# Patient Record
Sex: FEMALE | Race: WHITE | NOT HISPANIC OR LATINO | Employment: OTHER | URBAN - NONMETROPOLITAN AREA
[De-identification: names, ages, dates, MRNs, and addresses within clinical notes are randomized per-mention and may not be internally consistent; named-entity substitution may affect disease eponyms.]

---

## 2018-01-14 ENCOUNTER — HOSPITAL ENCOUNTER (EMERGENCY)
Facility: HOSPITAL | Age: 43
Discharge: HOME | End: 2018-01-14
Attending: PHYSICIAN ASSISTANT | Admitting: PHYSICIAN ASSISTANT
Payer: COMMERCIAL

## 2018-01-14 VITALS
RESPIRATION RATE: 16 BRPM | TEMPERATURE: 97.6 F | DIASTOLIC BLOOD PRESSURE: 73 MMHG | SYSTOLIC BLOOD PRESSURE: 130 MMHG | OXYGEN SATURATION: 97 %

## 2018-01-14 DIAGNOSIS — B35.4 TINEA CORPORIS: ICD-10-CM

## 2018-01-14 DIAGNOSIS — L55.1 SECOND DEGREE SUNBURN: ICD-10-CM

## 2018-01-14 PROCEDURE — 99203 OFFICE O/P NEW LOW 30 MIN: CPT | Performed by: PHYSICIAN ASSISTANT

## 2018-01-14 PROCEDURE — 25000132 ZZH RX MED GY IP 250 OP 250 PS 637: Performed by: PHYSICIAN ASSISTANT

## 2018-01-14 PROCEDURE — G0463 HOSPITAL OUTPT CLINIC VISIT: HCPCS

## 2018-01-14 RX ORDER — FLUCONAZOLE 150 MG/1
TABLET ORAL
Qty: 1 TABLET | Refills: 0 | Status: SHIPPED | OUTPATIENT
Start: 2018-01-14 | End: 2020-09-08

## 2018-01-14 RX ORDER — FLUCONAZOLE 150 MG/1
150 TABLET ORAL DAILY
Status: DISCONTINUED | OUTPATIENT
Start: 2018-01-14 | End: 2018-01-14 | Stop reason: HOSPADM

## 2018-01-14 RX ORDER — KETOCONAZOLE 20 MG/G
CREAM TOPICAL
Qty: 60 G | Refills: 0 | Status: SHIPPED | OUTPATIENT
Start: 2018-01-14 | End: 2020-09-08

## 2018-01-14 RX ADMIN — FLUCONAZOLE 150 MG: 150 TABLET ORAL at 19:43

## 2018-01-14 ASSESSMENT — ENCOUNTER SYMPTOMS
CARDIOVASCULAR NEGATIVE: 1
PSYCHIATRIC NEGATIVE: 1
NEUROLOGICAL NEGATIVE: 1
CONSTITUTIONAL NEGATIVE: 1

## 2018-01-14 NOTE — ED AVS SNAPSHOT
[facility]    [address] [address]    Phone:  [phone]                                       Freda Alanis   MRN: [id number]    Department:  [facility]   Date of Visit:  1/14/2018           Patient Information     Date Of Birth          1975        Your diagnoses for this visit were:     Tinea corporis     Second degree sunburn        You were seen by Elizabeth Barakat PA.      Follow-up Information     Please follow up.    Why:  If symptoms worsen, with a Dermatologist.        Follow up with [facility].    Specialty:  EMERGENCY MEDICINE    Why:  If further concerns develop    Contact information:    [address] [address] [phone]    Additional information:    From [facility]: Take [address]. Turn left at [address]. Turn left at the first stoplight on [address]. At the first stop sign, take a right onto [address]. Take a left into the parking lot and continue through until you reach the North enterance of the building.       From [place]: Take [address]. Take the [address] ramp towards [place]. Turn left onto [address]. Take a slight right onto [address] [facility]. Turn left at the first stoplight on [address]. At the first stop sign, take a right onto [address]. Take a left into the parking lot and continue through until you reach the North enterance of the building.       From [place]: Take [address]. Take a right at [address]. At the first stop sign, take a right onto [address]. Take a left into the parking lot and continue through until you reach the North enterance of the building.       Discharge References/Attachments     FUNGAL SKIN INFECTION (TINEA) (ENGLISH)    SUNBURN (ENGLISH)         Review of your medicines      START taking        Dose / Directions Last dose taken    fluconazole 150 MG tablet   Commonly  "known as:  DIFLUCAN   Quantity:  1 tablet        Take one tablet in two days   Refills:  0        ketoconazole 2 % cream   Commonly known as:  NIZORAL   Quantity:  60 g        Apply thin layer twice a day when you have the rash   Refills:  0          Our records show that you are taking the medicines listed below. If these are incorrect, please call your family doctor or clinic.        Dose / Directions Last dose taken    SPIRONOLACTONE PO        Refills:  0        TRAZODONE HCL PO        Take  by mouth nightly as needed.   Refills:  0                Prescriptions were sent or printed at these locations (2 Prescriptions)                   [facility] - [place], [place] - [address] AT [facility] [address]   [address], [place] [address]    Telephone:  [phone]   Fax:  [phone]   Hours:                  E-Prescribed (2 of 2)         fluconazole (DIFLUCAN) 150 MG tablet               ketoconazole (NIZORAL) 2 % cream                Orders Needing Specimen Collection     None      Pending Results     No orders found from 1/12/2018 to 1/15/2018.            Pending Culture Results     No orders found from 1/12/2018 to 1/15/2018.            Thank you for choosing [facility]       Thank you for choosing [facility] for your care. Our goal is always to provide you with excellent care. Hearing back from our patients is one way we can continue to improve our services. Please take a few minutes to complete the written survey that you may receive in the mail after you visit with us. Thank you!        [employer] Information     [employer] lets you send messages to your doctor, view your test results, renew your prescriptions, schedule appointments and more. To sign up, go to [email or web address] . Click on \"Log in\" on the left side of the screen, which will take you to the Welcome page. Then click on \"Sign up Now\" on the right side of the page.     You will be asked to enter the access code listed below, " as well as some personal information. Please follow the directions to create your username and password.     Your access code is: [email or web address]  Expires: 2018  7:03 PM     Your access code will  in 90 days. If you need help or a new code, please call your [facility] clinic or [phone].        Care EveryWhere ID     This is your Care EveryWhere ID. This could be used by other organizations to access your [facility] medical records  [id number]        Equal Access to Services     MADDIE DE LA O : Sheryl adames Soadrien, waraeann lujackyadaha, qananci kaalmalasha guerra, jamaal tompkins . So [facility] [phone].    ATENCIÓN: Si habla español, tiene a breen disposición servicios gratuitos de asistencia lingüística. Llame al [phone].    We comply with applicable federal civil rights laws and [place] laws. We do not discriminate on the basis of race, color, national origin, age, disability, sex, sexual orientation, or gender identity.            After Visit Summary       This is your record. Keep this with you and show to your community pharmacist(s) and doctor(s) at your next visit.

## 2018-01-14 NOTE — ED AVS SNAPSHOT
[facility]    [address] [address]    Phone:  [phone]                                       Freda Alanis   MRN: [id number]    Department:  [facility]   Date of Visit:  1/14/2018           After Visit Summary Signature Page     I have received my discharge instructions, and my questions have been answered. I have discussed any challenges I see with this plan with the nurse or doctor.    ..........................................................................................................................................  Patient/Patient Representative Signature      ..........................................................................................................................................  Patient Representative Print Name and Relationship to Patient    ..................................................               ................................................  Date                                            Time    ..........................................................................................................................................  Reviewed by Signature/Title    ...................................................              ..............................................  Date                                                            Time

## 2018-01-15 NOTE — ED PROVIDER NOTES
History     Chief Complaint   Patient presents with     Rash     c/o rash on chest, notes burning, notes has been in the tanning [place] with the last day being friday.      The history is provided by the patient. No  was used.     Freda Alanis is a 42 year old female who has developed an itchy /burning rash between her breasts.  Pt tans using tanning beds.  Last use was 3 days ago. Pt tans for 15 minutes at a time. Pt admits she  Sweats much during these sessions. She has a h/o skin cancer x 3. Yet, she refuses to stop tanning.     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    Social History:  Marital Status:   [2]  Social History   Substance Use Topics     Smoking status: Current Every Day Smoker     Years: 10.00     Types: Cigarettes     Smokeless tobacco: Never Used     Alcohol use Yes        Medications:      SPIRONOLACTONE PO   fluconazole (DIFLUCAN) 150 MG tablet   ketoconazole (NIZORAL) 2 % cream   TRAZODONE HCL PO         Review of Systems   Constitutional: Negative.    HENT: Negative.    Cardiovascular: Negative.    Skin: Positive for rash.   Neurological: Negative.    Psychiatric/Behavioral: Negative.        Physical Exam   BP: 130/73  Heart Rate: 102  Temp: 97.6  F (36.4  C)  Resp: 16  SpO2: 97 %      Physical Exam   Constitutional: She is oriented to person, place, and time. She appears well-developed and well-nourished. No distress.   Cardiovascular:   tachycardia   Pulmonary/Chest: Effort normal.   Neurological:  She is alert and oriented to person, place, and time.   Skin: Rash noted. She is not diaphoretic.   Chest: between the breasts there is a fine small blisters notes. No vesicles. No pustules. No TTP. Base of erythema with mild raised leading edge   Psychiatric: She has a normal mood and affect.   Nursing note and vitals reviewed.      ED Course     ED Course     Procedures               Assessments & Plan (with Medical Decision Making)     I have reviewed the nursing notes.    I have reviewed the findings, diagnosis, plan and need for follow up with the patient.      Discharge Medication List as of 1/14/2018  7:33 PM      START taking these medications    Details   fluconazole (DIFLUCAN) 150 MG tablet Take one tablet in two days, Disp-1 tablet, R-0, E-Prescribe      ketoconazole (NIZORAL) 2 % cream Apply thin layer twice a day when you have the rashDisp-60 g, [id number]             Final diagnoses:   Tinea corporis   Second degree sunburn       Stop tanning!  Patient verbally educated and given appropriate education sheets for the diagnoses and has no questions.  Use medications as directed.   Follow up with a Dermatologist if symptoms increase or if they are not resolving.   if concerns develop, return to the ER  Elizabeth Barakat Certified  Physician Assistant  1/14/2018  7:53 PM  URGENT CARE CLINIC      1/14/2018   [facility]     Elizabeth Barakat PA  01/14/18 1954

## 2020-09-01 NOTE — PROGRESS NOTES
"Subjective     Freda Alanis is a 45 year old female who presents to clinic today for the following health issues:    HPI       New Patient/Transfer of Care    Previous care with [facility]- does not know provider she went to or the clinic name. No release signed     Moved to the area about 3 years ago - has not established with any provider prior to today     Unknown  PAP 2017- miscarriage at that time states it was normal  Mammogram has never had a mammogram     Smoker 5-6 per day   Alcohol use couple glasses per day   Street drugs denies     Elevated blood pressure and pulse today   This is not her normal - will continue to monitor     Anxiety and depression  States she is well controlled   No medications for 3 years  Denies counseling or need at this time  PHQ 9/8/2020   PHQ-9 Total Score 6   Q9: Thoughts of better off dead/self-harm past 2 weeks Not at all     LUIS-7 SCORE 9/8/2020   Total Score 1           Musculoskeletal problem/pain  Onset/Duration: about 4 months ago  Description  Location: bilateral feet, hands and lower back - generalized all over at different times  Joint Swelling: no  Redness: no  Pain: YES  Warmth: no  Intensity:  6/10  Progression of Symptoms:  worsening  Accompanying signs and symptoms:   Fevers: no  Numbness/tingling/weakness: YES  History  Trauma to the area: no  Recent illness:  no  Previous similar problem: no  Previous evaluation:  no  Precipitating or alleviating factors:  Aggravating factors include: \"anything\" touching, bumping  Therapies tried and outcome: tylenol and advil- minor relief   She tries to stretch and elevate at times  She has not tried ice or heat  Denies chiropractor or massage therapu       Review of Systems   CONSTITUTIONAL: NEGATIVE for fever, chills, change in weight  INTEGUMENTARY/SKIN: history of eczema on face   EYES: NEGATIVE for vision changes or irritation  ENT/MOUTH: NEGATIVE for ear, mouth and throat problems  RESP:NEGATIVE for " significant cough or SOB  CV: NEGATIVE for chest pain, palpitations or peripheral edema  GI: lactose and gluten intolerance   : normal menstrual cycles and denies dysuria   MUSCULOSKELETAL: generalized   NEURO: weakness into her hands   ENDOCRINE: hair thinning/feels like it is falling out   PSYCHIATRIC: NEGATIVE for changes in mood or affect      Objective    BP (!) 155/94   Pulse 101   Temp 98.8  F (37.1  C) (Tympanic)   Wt 73.9 kg (163 lb)   SpO2 96%   BMI 28.20 kg/m    Body mass index is 28.2 kg/m .  Physical Exam   GENERAL: alert and no distress  NECK: no adenopathy, no asymmetry, masses, or scars and thyroid normal to palpation  RESP: lungs clear to auscultation - no rales, rhonchi or wheezes  CV: regular rate and rhythm, normal S1 S2, no S3 or S4, no murmur, click or rub, no peripheral edema and peripheral pulses strong  ABDOMEN: soft, nontender, no hepatosplenomegaly, no masses and bowel sounds normal  MS: no gross musculoskeletal defects noted, no edema  SKIN: no suspicious lesions or rashes  NEURO: Normal strength and tone, mentation intact and speech normal  PSYCH: mentation appears normal, affect normal/bright    Results for orders placed or performed in visit on 09/08/20 (from the past 24 hour(s))   CBC with platelets differential   Result Value Ref Range    WBC 10.0 4.0 - 11.0 10e9/L    RBC Count 4.23 3.8 - 5.2 10e12/L    Hemoglobin 15.2 11.7 - 15.7 g/dL    Hematocrit 44.7 35.0 - 47.0 %     (H) 78 - 100 fl    MCH 35.9 (H) 26.5 - 33.0 pg    MCHC 34.0 31.5 - 36.5 g/dL    RDW 14.1 10.0 - 15.0 %    Platelet Count 332 150 - 450 10e9/L    Diff Method Automated Method     % Neutrophils 66.7 %    % Lymphocytes 17.5 %    % Monocytes 12.3 %    % Eosinophils 2.2 %    % Basophils 0.6 %    % Immature Granulocytes 0.7 %    Nucleated RBCs 0 0 /100    Absolute Neutrophil 6.7 1.6 - 8.3 10e9/L    Absolute Lymphocytes 1.8 0.8 - 5.3 10e9/L    Absolute Monocytes 1.2 0.0 - 1.3 10e9/L    Absolute Eosinophils  0.2 0.0 - 0.7 10e9/L    Absolute Basophils 0.1 0.0 - 0.2 10e9/L    Abs Immature Granulocytes 0.1 0 - 0.4 10e9/L    Absolute Nucleated RBC 0.0    Comprehensive metabolic panel   Result Value Ref Range    Sodium 136 133 - 144 mmol/L    Potassium 3.9 3.4 - 5.3 mmol/L    Chloride 102 94 - 109 mmol/L    Carbon Dioxide 27 20 - 32 mmol/L    Anion Gap 7 3 - 14 mmol/L    Glucose 125 (H) 70 - 99 mg/dL    Urea Nitrogen 4 (L) 7 - 30 mg/dL    Creatinine 0.54 0.52 - 1.04 mg/dL    GFR Estimate >90 >60 mL/min/[1.73_m2]    GFR Estimate If Black >90 >60 mL/min/[1.73_m2]    Calcium 9.5 8.5 - 10.1 mg/dL    Bilirubin Total 0.4 0.2 - 1.3 mg/dL    Albumin 3.7 3.4 - 5.0 g/dL    Protein Total 8.1 6.8 - 8.8 g/dL    Alkaline Phosphatase 168 (H) 40 - 150 U/L     (H) 0 - 50 U/L     (H) 0 - 45 U/L   CRP inflammation   Result Value Ref Range    CRP Inflammation 9.0 (H) 0.0 - 8.0 mg/L   Uric acid   Result Value Ref Range    Uric Acid 5.6 2.6 - 6.0 mg/dL   TSH with free T4 reflex   Result Value Ref Range    TSH 2.72 0.40 - 4.00 mU/L   Hemoglobin A1c   Result Value Ref Range    Hemoglobin A1C 6.1 (H) 0 - 5.6 %             Assessment & Plan     Multiple joint pain  Reviewed labs  Elevated liver enzymes, encouraged to stop drinking, limit tylenol and NSAID use  Added additional labs  Waiting on NICHOLAS, Rheumatoid factor and tick labs  Discussed symptomatic treatment with warm soaks, daily stretching   - CBC with platelets differential  - Comprehensive metabolic panel  - CRP inflammation  - Rheumatoid factor  - Uric acid  - Parasite stain  - Lyme Disease Laura with reflex to WB Serum  - Anti Nuclear Laura IgG by IFA with Reflex  - naproxen (NAPROSYN) 500 MG tablet; Take 1 tablet (500 mg) by mouth 2 times daily (with meals)  - TSH with free T4 reflex  - Vitamin B12  - Folate  - Estimated Average Glucose    Anxiety and depression  States symptoms are controlled     Elevated liver enzymes  Encouraged alcohol cessation  Limited tylenol and  ibuprofen  Consider healthy eating, staying hydrated and weigh loss  - Hepatitis C Screen Reflex to HCV RNA Quant and Genotype  - Hepatitis B Surface Antibody    Pre-diabetes  Consider healthy eating,   - Hemoglobin A1c     Tobacco Cessation:   reports that she has been smoking cigarettes. She started smoking about 25 years ago. She has smoked for the past 10.00 years. She has never used smokeless tobacco.    Plan to have come back in for a PAP and additional labs     See Patient Instructions    No follow-ups on file.    DANTE Sanchez CNP  [facility] - [place]

## 2020-09-08 ENCOUNTER — OFFICE VISIT (OUTPATIENT)
Dept: FAMILY MEDICINE | Facility: OTHER | Age: 45
End: 2020-09-08
Attending: NURSE PRACTITIONER
Payer: COMMERCIAL

## 2020-09-08 VITALS
OXYGEN SATURATION: 96 % | SYSTOLIC BLOOD PRESSURE: 132 MMHG | TEMPERATURE: 98.8 F | DIASTOLIC BLOOD PRESSURE: 80 MMHG | BODY MASS INDEX: 28.2 KG/M2 | HEART RATE: 101 BPM | WEIGHT: 163 LBS

## 2020-09-08 DIAGNOSIS — F32.A ANXIETY AND DEPRESSION: ICD-10-CM

## 2020-09-08 DIAGNOSIS — M25.50 MULTIPLE JOINT PAIN: Primary | ICD-10-CM

## 2020-09-08 DIAGNOSIS — R74.8 ELEVATED LIVER ENZYMES: ICD-10-CM

## 2020-09-08 DIAGNOSIS — R73.03 PRE-DIABETES: ICD-10-CM

## 2020-09-08 DIAGNOSIS — F41.9 ANXIETY AND DEPRESSION: ICD-10-CM

## 2020-09-08 LAB
ALBUMIN SERPL-MCNC: 3.7 G/DL (ref 3.4–5)
ALP SERPL-CCNC: 168 U/L (ref 40–150)
ALT SERPL W P-5'-P-CCNC: 140 U/L (ref 0–50)
ANION GAP SERPL CALCULATED.3IONS-SCNC: 7 MMOL/L (ref 3–14)
AST SERPL W P-5'-P-CCNC: 240 U/L (ref 0–45)
BASOPHILS # BLD AUTO: 0.1 10E9/L (ref 0–0.2)
BASOPHILS NFR BLD AUTO: 0.6 %
BILIRUB SERPL-MCNC: 0.4 MG/DL (ref 0.2–1.3)
BUN SERPL-MCNC: 4 MG/DL (ref 7–30)
CALCIUM SERPL-MCNC: 9.5 MG/DL (ref 8.5–10.1)
CHLORIDE SERPL-SCNC: 102 MMOL/L (ref 94–109)
CO2 SERPL-SCNC: 27 MMOL/L (ref 20–32)
CREAT SERPL-MCNC: 0.54 MG/DL (ref 0.52–1.04)
CRP SERPL-MCNC: 9 MG/L (ref 0–8)
DIFFERENTIAL METHOD BLD: ABNORMAL
EOSINOPHIL # BLD AUTO: 0.2 10E9/L (ref 0–0.7)
EOSINOPHIL NFR BLD AUTO: 2.2 %
ERYTHROCYTE [DISTWIDTH] IN BLOOD BY AUTOMATED COUNT: 14.1 % (ref 10–15)
EST. AVERAGE GLUCOSE BLD GHB EST-MCNC: 128 MG/DL
FOLATE SERPL-MCNC: 5.7 NG/ML
GFR SERPL CREATININE-BSD FRML MDRD: >90 ML/MIN/{1.73_M2}
GLUCOSE SERPL-MCNC: 125 MG/DL (ref 70–99)
HBA1C MFR BLD: 6.1 % (ref 0–5.6)
HCT VFR BLD AUTO: 44.7 % (ref 35–47)
HGB BLD-MCNC: 15.2 G/DL (ref 11.7–15.7)
IMM GRANULOCYTES # BLD: 0.1 10E9/L (ref 0–0.4)
IMM GRANULOCYTES NFR BLD: 0.7 %
LYMPHOCYTES # BLD AUTO: 1.8 10E9/L (ref 0.8–5.3)
LYMPHOCYTES NFR BLD AUTO: 17.5 %
MCH RBC QN AUTO: 35.9 PG (ref 26.5–33)
MCHC RBC AUTO-ENTMCNC: 34 G/DL (ref 31.5–36.5)
MCV RBC AUTO: 106 FL (ref 78–100)
MONOCYTES # BLD AUTO: 1.2 10E9/L (ref 0–1.3)
MONOCYTES NFR BLD AUTO: 12.3 %
NEUTROPHILS # BLD AUTO: 6.7 10E9/L (ref 1.6–8.3)
NEUTROPHILS NFR BLD AUTO: 66.7 %
NRBC # BLD AUTO: 0 10*3/UL
NRBC BLD AUTO-RTO: 0 /100
PLATELET # BLD AUTO: 332 10E9/L (ref 150–450)
POTASSIUM SERPL-SCNC: 3.9 MMOL/L (ref 3.4–5.3)
PROT SERPL-MCNC: 8.1 G/DL (ref 6.8–8.8)
RBC # BLD AUTO: 4.23 10E12/L (ref 3.8–5.2)
SODIUM SERPL-SCNC: 136 MMOL/L (ref 133–144)
TSH SERPL DL<=0.005 MIU/L-ACNC: 2.72 MU/L (ref 0.4–4)
URATE SERPL-MCNC: 5.6 MG/DL (ref 2.6–6)
VIT B12 SERPL-MCNC: 393 PG/ML (ref 193–986)
WBC # BLD AUTO: 10 10E9/L (ref 4–11)

## 2020-09-08 PROCEDURE — 86706 HEP B SURFACE ANTIBODY: CPT | Performed by: NURSE PRACTITIONER

## 2020-09-08 PROCEDURE — 36415 COLL VENOUS BLD VENIPUNCTURE: CPT | Performed by: NURSE PRACTITIONER

## 2020-09-08 PROCEDURE — 84550 ASSAY OF BLOOD/URIC ACID: CPT | Performed by: NURSE PRACTITIONER

## 2020-09-08 PROCEDURE — 86803 HEPATITIS C AB TEST: CPT | Performed by: NURSE PRACTITIONER

## 2020-09-08 PROCEDURE — 86618 LYME DISEASE ANTIBODY: CPT | Performed by: NURSE PRACTITIONER

## 2020-09-08 PROCEDURE — 87015 SPECIMEN INFECT AGNT CONCNTJ: CPT | Performed by: NURSE PRACTITIONER

## 2020-09-08 PROCEDURE — 86431 RHEUMATOID FACTOR QUANT: CPT | Performed by: NURSE PRACTITIONER

## 2020-09-08 PROCEDURE — 99214 OFFICE O/P EST MOD 30 MIN: CPT | Performed by: NURSE PRACTITIONER

## 2020-09-08 PROCEDURE — 82607 VITAMIN B-12: CPT | Performed by: NURSE PRACTITIONER

## 2020-09-08 PROCEDURE — 86038 ANTINUCLEAR ANTIBODIES: CPT | Performed by: NURSE PRACTITIONER

## 2020-09-08 PROCEDURE — 86140 C-REACTIVE PROTEIN: CPT | Performed by: NURSE PRACTITIONER

## 2020-09-08 PROCEDURE — 83036 HEMOGLOBIN GLYCOSYLATED A1C: CPT | Performed by: NURSE PRACTITIONER

## 2020-09-08 PROCEDURE — 80050 GENERAL HEALTH PANEL: CPT | Performed by: NURSE PRACTITIONER

## 2020-09-08 PROCEDURE — 82746 ASSAY OF FOLIC ACID SERUM: CPT | Performed by: NURSE PRACTITIONER

## 2020-09-08 PROCEDURE — 87207 SMEAR SPECIAL STAIN: CPT | Performed by: NURSE PRACTITIONER

## 2020-09-08 RX ORDER — MULTIVITAMIN,THERAPEUTIC
1 TABLET ORAL DAILY
COMMUNITY
End: 2023-05-10

## 2020-09-08 RX ORDER — LORATADINE 10 MG/1
10 TABLET ORAL DAILY PRN
COMMUNITY

## 2020-09-08 RX ORDER — NAPROXEN 500 MG/1
500 TABLET ORAL 2 TIMES DAILY WITH MEALS
Qty: 60 TABLET | Refills: 1 | Status: ON HOLD | OUTPATIENT
Start: 2020-09-08 | End: 2023-05-01

## 2020-09-08 ASSESSMENT — ANXIETY QUESTIONNAIRES
5. BEING SO RESTLESS THAT IT IS HARD TO SIT STILL: NOT AT ALL
7. FEELING AFRAID AS IF SOMETHING AWFUL MIGHT HAPPEN: NOT AT ALL
6. BECOMING EASILY ANNOYED OR IRRITABLE: NOT AT ALL
4. TROUBLE RELAXING: SEVERAL DAYS
2. NOT BEING ABLE TO STOP OR CONTROL WORRYING: NOT AT ALL
3. WORRYING TOO MUCH ABOUT DIFFERENT THINGS: NOT AT ALL
GAD7 TOTAL SCORE: 1
1. FEELING NERVOUS, ANXIOUS, OR ON EDGE: NOT AT ALL

## 2020-09-08 ASSESSMENT — PATIENT HEALTH QUESTIONNAIRE - PHQ9: SUM OF ALL RESPONSES TO PHQ QUESTIONS 1-9: 6

## 2020-09-08 ASSESSMENT — PAIN SCALES - GENERAL: PAINLEVEL: SEVERE PAIN (6)

## 2020-09-08 NOTE — LETTER
My Depression Action Plan  Name: Freda Alanis   Date of Birth 1975  Date: 9/8/2020    My doctor: No Ref-Primary, Physician   My clinic: [facility] [place]  [address] [place] [address]  [phone]          GREEN    ZONE   Good Control    What it looks like:     Things are going generally well. You have normal ups and downs. You may even feel depressed from time to time, but bad moods usually last less than a day.   What you need to do:  1. Continue to care for yourself (see self care plan)  2. Check your depression survival kit and update it as needed  3. Follow your physician s recommendations including any medication.  4. Do not stop taking medication unless you consult with your physician first.           YELLOW         ZONE Getting Worse    What it looks like:     Depression is starting to interfere with your life.     It may be hard to get out of bed; you may be starting to isolate yourself from others.    Symptoms of depression are starting to last most all day and this has happened for several days.     You may have suicidal thoughts but they are not constant.   What you need to do:     1. Call your care team. Your response to treatment will improve if you keep your care team informed of your progress. Yellow periods are signs an adjustment may need to be made.     2. Continue your self-care.  Just get dressed and ready for the day.  Don't give yourself time to talk yourself out of it.    3. Talk to someone in your support network.    4. Open up your Depression Self-Care Plan/Wellness Kit.           RED    ZONE Medical Alert - Get Help    What it looks like:     Depression is seriously interfering with your life.     You may experience these or other symptoms: You can t get out of bed most days, can t work or engage in other necessary activities, you have trouble taking care of basic hygiene, or basic responsibilities, thoughts of suicide or death that will not  go away, self-injurious behavior.     What you need to do:  1. Call your care team and request a same-day appointment. If they are not available (weekends or after hours) call your local crisis line, emergency room or 911.            Depression Self-Care Plan / Wellness Kit    Self-Care for Depression  Here s the deal. Your body and mind are really not as separate as most people think.  What you do and think affects how you feel and how you feel influences what you do and think. This means if you do things that people who feel good do, it will help you feel better.  Sometimes this is all it takes.  There is also a place for medication and therapy depending on how severe your depression is, so be sure to consult with your medical provider and/ or Behavioral Health Consultant if your symptoms are worsening or not improving.     In order to better manage my stress, I will:    Exercise  Get some form of exercise, every day. This will help reduce pain and release endorphins, the  feel good  chemicals in your brain. This is almost as good as taking antidepressants!  This is not the same as joining a gym and then never going! (they count on that by the way ) It can be as simple as just going for a walk or doing some gardening, anything that will get you moving.      Hygiene   Maintain good hygiene (get out of bed in the morning, make your bed, brush your teeth, take a shower, and get dressed like you were going to work, even if you are unemployed).  If your clothes don't fit try to get ones that do.    Diet  Strive to eat foods that are good for me, drink plenty of water, and avoid excessive sugar, caffeine, alcohol, and other mood-altering substances.  Some foods that are helpful in depression are: complex carbohydrates, B vitamins, flaxseed, fish or fish oil, fresh fruits and vegetables.    Psychotherapy  Agree to participate in Individual Therapy (if recommended).    Medication  If prescribed medications, I agree to  take them.  Missing doses can result in serious side effects.  I understand that drinking alcohol, or other illicit drug use, may cause potential side effects.  I will not stop my medication abruptly without first discussing it with my provider.    Staying Connected With Others  Stay in touch with my friends, family members, and my primary care provider/team.    Use your imagination  Be creative.  We all have a creative side; it doesn t matter if it s oil painting, sand castles, or mud pies! This will also kick up the endorphins.    Witness Beauty  (AKA stop and smell the roses) Take a look outside, even in mid-winter. Notice colors, textures. Watch the squirrels and birds.     Service to others  Be of service to others.  There is always someone else in need.  By helping others we can  get out of ourselves  and remember the really important things.  This also provides opportunities for practicing all the other parts of the program.    Humor  Laugh and be silly!  Adjust your TV habits for less news and crime-drama and more comedy.    Control your stress  Try breathing deep, massage therapy, biofeedback, and meditation. Find time to relax each day.     Crisis Text Line  http://www.crisistextline.org    The Crisis Text Line serves anyone, in any type of crisis, providing access to free, 24/7 support and information via the medium people already use and trust:    Here's how it works:  1.  Text 301-304 from anywhere in the [place], anytime, about any type of crisis.  2.  A live, trained Crisis Counselor receives the text and responds quickly.  3.  The volunteer Crisis Counselor will help you move from a 'hot moment to a cool moment'.    My support system    Clinic Contact:  Phone number:    Contact 1:  Phone number:    Contact 2:  Phone number:    Zoroastrianism/:  Phone number:    Therapist:  Phone number:    Local crisis center:    Phone number:    Other community support:  Phone number:

## 2020-09-08 NOTE — PATIENT INSTRUCTIONS
Patient Education     Arthralgia    Arthralgia is the term for pain in or around the joint. It is a symptom, not a disease. This pain may involve one or more joints. In some cases, the pain moves from joint to joint.  There are many causes for joint pain. These include:    Injury    Osteoarthritis (wearing out of the joint surface)    Gout (inflammation of the joint due to crystals in the joint fluid)    Infection inside the joint      Bursitis (inflammation of the fluid-filled sacs around the joint)    Autoimmune disorders such as rheumatoid arthritis or lupus    Tendonitis (inflammation of chords that attach muscle to bone)  Home care    Rest the involved joint(s) until your symptoms improve.     You may be prescribed pain medicine. If none is prescribed, you may use acetaminophen or ibuprofen to control pain and inflammation.    Follow-up care  Follow up with your healthcare provider or as advised.  When to seek medical advice  Contact your healthcare provider right away if any of the following occurs:    Pain, swelling, or redness of joint increases    Pain worsens or recurs after a period of improvement    Pain moves to other joints    You cannot bear weight on the affected joint     You cannot move the affected joint    Joint appears deformed    New rash appears    Fever of 100.4 F (38 C) or higher, or as directed by your healthcare provider  Date Last Reviewed: 3/1/2017    5327-7277 [employer]. [address] [place] [address], [place] [address]. All rights reserved. This information is not intended as a substitute for professional medical care. Always follow your healthcare professional's instructions.

## 2020-09-08 NOTE — NURSING NOTE
"Chief Complaint   Patient presents with     Establish Care     Musculoskeletal Problem       Initial BP (!) 155/94   Pulse 101   Temp 98.8  F (37.1  C) (Tympanic)   Wt 73.9 kg (163 lb)   SpO2 96%   BMI 28.20 kg/m   Estimated body mass index is 28.2 kg/m  as calculated from the following:    Height as of 10/22/12: 1.619 m (5' 3.75\").    Weight as of this encounter: 73.9 kg (163 lb).  Medication Reconciliation: complete  Angelica Franco LPN  "

## 2020-09-09 LAB
ANA SER QL IF: NEGATIVE
B BURGDOR IGG+IGM SER QL: 0.06 (ref 0–0.89)
HBV SURFACE AB SERPL IA-ACNC: 0 M[IU]/ML
HCV AB SERPL QL IA: NONREACTIVE
PARASITE SPEC INSPECT: NORMAL
RHEUMATOID FACT SER NEPH-ACNC: 10 IU/ML (ref 0–20)
SPECIMEN SOURCE: NORMAL

## 2020-09-09 ASSESSMENT — ANXIETY QUESTIONNAIRES: GAD7 TOTAL SCORE: 1

## 2021-05-25 ENCOUNTER — RECORDS - HEALTHEAST (OUTPATIENT)
Dept: ADMINISTRATIVE | Facility: CLINIC | Age: 46
End: 2021-05-25

## 2021-06-03 ENCOUNTER — RECORDS - HEALTHEAST (OUTPATIENT)
Dept: ADMINISTRATIVE | Facility: CLINIC | Age: 46
End: 2021-06-03

## 2023-04-30 ENCOUNTER — APPOINTMENT (OUTPATIENT)
Dept: CT IMAGING | Facility: HOSPITAL | Age: 48
End: 2023-04-30
Attending: EMERGENCY MEDICINE
Payer: COMMERCIAL

## 2023-04-30 ENCOUNTER — HOSPITAL ENCOUNTER (OUTPATIENT)
Facility: HOSPITAL | Age: 48
Setting detail: OBSERVATION
Discharge: HOME | End: 2023-05-04
Attending: EMERGENCY MEDICINE | Admitting: EMERGENCY MEDICINE
Payer: COMMERCIAL

## 2023-04-30 DIAGNOSIS — M62.81 GENERALIZED MUSCLE WEAKNESS: ICD-10-CM

## 2023-04-30 DIAGNOSIS — F41.1 ANXIETY STATE: Primary | ICD-10-CM

## 2023-04-30 DIAGNOSIS — R79.89 ABNORMAL LIVER FUNCTION TEST: ICD-10-CM

## 2023-04-30 DIAGNOSIS — E83.42 HYPOMAGNESEMIA: ICD-10-CM

## 2023-04-30 DIAGNOSIS — E86.0 DEHYDRATION: ICD-10-CM

## 2023-04-30 LAB
ALBUMIN SERPL BCG-MCNC: 4.4 G/DL (ref 3.5–5.2)
ALBUMIN UR-MCNC: 10 MG/DL
ALP SERPL-CCNC: 241 U/L (ref 35–104)
ALT SERPL W P-5'-P-CCNC: 158 U/L (ref 10–35)
ANION GAP SERPL CALCULATED.3IONS-SCNC: 15 MMOL/L (ref 7–15)
APPEARANCE UR: CLEAR
AST SERPL W P-5'-P-CCNC: 128 U/L (ref 10–35)
BACTERIA #/AREA URNS HPF: ABNORMAL /HPF
BASOPHILS # BLD AUTO: 0.1 10E3/UL (ref 0–0.2)
BASOPHILS NFR BLD AUTO: 1 %
BILIRUB SERPL-MCNC: 0.5 MG/DL
BILIRUB UR QL STRIP: NEGATIVE
BUN SERPL-MCNC: 5.7 MG/DL (ref 6–20)
CALCIUM SERPL-MCNC: 10.6 MG/DL (ref 8.6–10)
CHLORIDE SERPL-SCNC: 97 MMOL/L (ref 98–107)
COLOR UR AUTO: ABNORMAL
CREAT SERPL-MCNC: 0.45 MG/DL (ref 0.51–0.95)
DEPRECATED HCO3 PLAS-SCNC: 27 MMOL/L (ref 22–29)
EOSINOPHIL # BLD AUTO: 0.1 10E3/UL (ref 0–0.7)
EOSINOPHIL NFR BLD AUTO: 1 %
ERYTHROCYTE [DISTWIDTH] IN BLOOD BY AUTOMATED COUNT: 12 % (ref 10–15)
ETHANOL SERPL-MCNC: <0.01 G/DL
FLUAV RNA SPEC QL NAA+PROBE: NEGATIVE
FLUBV RNA RESP QL NAA+PROBE: NEGATIVE
GFR SERPL CREATININE-BSD FRML MDRD: >90 ML/MIN/1.73M2
GLUCOSE SERPL-MCNC: 113 MG/DL (ref 70–99)
GLUCOSE UR STRIP-MCNC: NEGATIVE MG/DL
HCG INTACT+B SERPL-ACNC: 1 MIU/ML
HCT VFR BLD AUTO: 42.4 % (ref 35–47)
HGB BLD-MCNC: 14.9 G/DL (ref 11.7–15.7)
HGB UR QL STRIP: NEGATIVE
HOLD SPECIMEN: NORMAL
HOLD SPECIMEN: NORMAL
IMM GRANULOCYTES # BLD: 0.1 10E3/UL
IMM GRANULOCYTES NFR BLD: 1 %
KETONES UR STRIP-MCNC: NEGATIVE MG/DL
LACTATE SERPL-SCNC: 1.5 MMOL/L (ref 0.7–2)
LACTATE SERPL-SCNC: 2.1 MMOL/L (ref 0.7–2)
LEUKOCYTE ESTERASE UR QL STRIP: NEGATIVE
LIPASE SERPL-CCNC: 21 U/L (ref 13–60)
LYMPHOCYTES # BLD AUTO: 2 10E3/UL (ref 0.8–5.3)
LYMPHOCYTES NFR BLD AUTO: 18 %
MAGNESIUM SERPL-MCNC: 1.6 MG/DL (ref 1.7–2.3)
MCH RBC QN AUTO: 34.8 PG (ref 26.5–33)
MCHC RBC AUTO-ENTMCNC: 35.1 G/DL (ref 31.5–36.5)
MCV RBC AUTO: 99 FL (ref 78–100)
MONOCYTES # BLD AUTO: 1.2 10E3/UL (ref 0–1.3)
MONOCYTES NFR BLD AUTO: 10 %
MUCOUS THREADS #/AREA URNS LPF: PRESENT /LPF
NEUTROPHILS # BLD AUTO: 7.8 10E3/UL (ref 1.6–8.3)
NEUTROPHILS NFR BLD AUTO: 69 %
NITRATE UR QL: NEGATIVE
NRBC # BLD AUTO: 0 10E3/UL
NRBC BLD AUTO-RTO: 0 /100
PH UR STRIP: 6.5 [PH] (ref 4.7–8)
PHOSPHATE SERPL-MCNC: 3.6 MG/DL (ref 2.5–4.5)
PLATELET # BLD AUTO: 353 10E3/UL (ref 150–450)
POTASSIUM SERPL-SCNC: 4 MMOL/L (ref 3.4–5.3)
PROCALCITONIN SERPL IA-MCNC: 0.14 NG/ML
PROT SERPL-MCNC: 7.5 G/DL (ref 6.4–8.3)
RBC # BLD AUTO: 4.28 10E6/UL (ref 3.8–5.2)
RBC URINE: 1 /HPF
RSV RNA SPEC NAA+PROBE: NEGATIVE
SARS-COV-2 RNA RESP QL NAA+PROBE: NEGATIVE
SODIUM SERPL-SCNC: 139 MMOL/L (ref 136–145)
SP GR UR STRIP: 1.02 (ref 1–1.03)
SQUAMOUS EPITHELIAL: 7 /HPF
TSH SERPL DL<=0.005 MIU/L-ACNC: 1.42 UIU/ML (ref 0.3–4.2)
UROBILINOGEN UR STRIP-MCNC: NORMAL MG/DL
WBC # BLD AUTO: 11.3 10E3/UL (ref 4–11)
WBC URINE: 3 /HPF

## 2023-04-30 PROCEDURE — C9803 HOPD COVID-19 SPEC COLLECT: HCPCS

## 2023-04-30 PROCEDURE — 99222 1ST HOSP IP/OBS MODERATE 55: CPT | Performed by: INTERNAL MEDICINE

## 2023-04-30 PROCEDURE — 96365 THER/PROPH/DIAG IV INF INIT: CPT

## 2023-04-30 PROCEDURE — 81001 URINALYSIS AUTO W/SCOPE: CPT | Performed by: EMERGENCY MEDICINE

## 2023-04-30 PROCEDURE — 83605 ASSAY OF LACTIC ACID: CPT | Performed by: EMERGENCY MEDICINE

## 2023-04-30 PROCEDURE — 74177 CT ABD & PELVIS W/CONTRAST: CPT

## 2023-04-30 PROCEDURE — 250N000013 HC RX MED GY IP 250 OP 250 PS 637: Performed by: INTERNAL MEDICINE

## 2023-04-30 PROCEDURE — 84145 PROCALCITONIN (PCT): CPT | Performed by: EMERGENCY MEDICINE

## 2023-04-30 PROCEDURE — 250N000011 HC RX IP 250 OP 636: Performed by: EMERGENCY MEDICINE

## 2023-04-30 PROCEDURE — 258N000003 HC RX IP 258 OP 636: Performed by: INTERNAL MEDICINE

## 2023-04-30 PROCEDURE — 87637 SARSCOV2&INF A&B&RSV AMP PRB: CPT | Performed by: EMERGENCY MEDICINE

## 2023-04-30 PROCEDURE — 82077 ASSAY SPEC XCP UR&BREATH IA: CPT | Performed by: EMERGENCY MEDICINE

## 2023-04-30 PROCEDURE — G0378 HOSPITAL OBSERVATION PER HR: HCPCS

## 2023-04-30 PROCEDURE — 80053 COMPREHEN METABOLIC PANEL: CPT | Performed by: EMERGENCY MEDICINE

## 2023-04-30 PROCEDURE — 258N000003 HC RX IP 258 OP 636: Performed by: EMERGENCY MEDICINE

## 2023-04-30 PROCEDURE — 84443 ASSAY THYROID STIM HORMONE: CPT | Performed by: EMERGENCY MEDICINE

## 2023-04-30 PROCEDURE — 83690 ASSAY OF LIPASE: CPT | Performed by: EMERGENCY MEDICINE

## 2023-04-30 PROCEDURE — 96361 HYDRATE IV INFUSION ADD-ON: CPT

## 2023-04-30 PROCEDURE — 84100 ASSAY OF PHOSPHORUS: CPT | Performed by: INTERNAL MEDICINE

## 2023-04-30 PROCEDURE — 99285 EMERGENCY DEPT VISIT HI MDM: CPT | Mod: CS | Performed by: EMERGENCY MEDICINE

## 2023-04-30 PROCEDURE — 70450 CT HEAD/BRAIN W/O DYE: CPT

## 2023-04-30 PROCEDURE — 83735 ASSAY OF MAGNESIUM: CPT | Performed by: EMERGENCY MEDICINE

## 2023-04-30 PROCEDURE — 70460 CT HEAD/BRAIN W/DYE: CPT

## 2023-04-30 PROCEDURE — 93005 ELECTROCARDIOGRAM TRACING: CPT

## 2023-04-30 PROCEDURE — 36415 COLL VENOUS BLD VENIPUNCTURE: CPT | Performed by: EMERGENCY MEDICINE

## 2023-04-30 PROCEDURE — 250N000013 HC RX MED GY IP 250 OP 250 PS 637: Performed by: FAMILY MEDICINE

## 2023-04-30 PROCEDURE — 99285 EMERGENCY DEPT VISIT HI MDM: CPT | Mod: 25,CS

## 2023-04-30 PROCEDURE — 85025 COMPLETE CBC W/AUTO DIFF WBC: CPT | Performed by: EMERGENCY MEDICINE

## 2023-04-30 PROCEDURE — 84702 CHORIONIC GONADOTROPIN TEST: CPT | Performed by: EMERGENCY MEDICINE

## 2023-04-30 PROCEDURE — 250N000013 HC RX MED GY IP 250 OP 250 PS 637: Performed by: EMERGENCY MEDICINE

## 2023-04-30 PROCEDURE — 96375 TX/PRO/DX INJ NEW DRUG ADDON: CPT | Mod: XU

## 2023-04-30 RX ORDER — LORAZEPAM 2 MG/ML
1-2 INJECTION INTRAMUSCULAR EVERY 30 MIN PRN
Status: DISCONTINUED | OUTPATIENT
Start: 2023-04-30 | End: 2023-05-04 | Stop reason: HOSPADM

## 2023-04-30 RX ORDER — CLONIDINE HYDROCHLORIDE 0.1 MG/1
0.1 TABLET ORAL EVERY 8 HOURS PRN
Status: DISCONTINUED | OUTPATIENT
Start: 2023-04-30 | End: 2023-05-04 | Stop reason: HOSPADM

## 2023-04-30 RX ORDER — GABAPENTIN 100 MG/1
100 CAPSULE ORAL 3 TIMES DAILY PRN
Status: DISCONTINUED | OUTPATIENT
Start: 2023-04-30 | End: 2023-05-04 | Stop reason: HOSPADM

## 2023-04-30 RX ORDER — ACETAMINOPHEN 325 MG/1
325 TABLET ORAL EVERY 6 HOURS PRN
Status: DISCONTINUED | OUTPATIENT
Start: 2023-04-30 | End: 2023-05-04 | Stop reason: HOSPADM

## 2023-04-30 RX ORDER — MAGNESIUM SULFATE HEPTAHYDRATE 40 MG/ML
2 INJECTION, SOLUTION INTRAVENOUS ONCE
Status: COMPLETED | OUTPATIENT
Start: 2023-04-30 | End: 2023-04-30

## 2023-04-30 RX ORDER — FLUMAZENIL 0.1 MG/ML
0.2 INJECTION, SOLUTION INTRAVENOUS
Status: DISCONTINUED | OUTPATIENT
Start: 2023-04-30 | End: 2023-05-04 | Stop reason: HOSPADM

## 2023-04-30 RX ORDER — ONDANSETRON 2 MG/ML
4 INJECTION INTRAMUSCULAR; INTRAVENOUS EVERY 6 HOURS PRN
Status: DISCONTINUED | OUTPATIENT
Start: 2023-04-30 | End: 2023-05-04 | Stop reason: HOSPADM

## 2023-04-30 RX ORDER — ONDANSETRON 4 MG/1
4 TABLET, ORALLY DISINTEGRATING ORAL EVERY 6 HOURS PRN
Status: DISCONTINUED | OUTPATIENT
Start: 2023-04-30 | End: 2023-05-04 | Stop reason: HOSPADM

## 2023-04-30 RX ORDER — LIDOCAINE 40 MG/G
CREAM TOPICAL
Status: DISCONTINUED | OUTPATIENT
Start: 2023-04-30 | End: 2023-05-04 | Stop reason: HOSPADM

## 2023-04-30 RX ORDER — MULTIPLE VITAMINS W/ MINERALS TAB 9MG-400MCG
1 TAB ORAL DAILY
Status: DISCONTINUED | OUTPATIENT
Start: 2023-04-30 | End: 2023-05-04 | Stop reason: HOSPADM

## 2023-04-30 RX ORDER — ACETAMINOPHEN 650 MG/1
325 SUPPOSITORY RECTAL EVERY 6 HOURS PRN
Status: DISCONTINUED | OUTPATIENT
Start: 2023-04-30 | End: 2023-05-04 | Stop reason: HOSPADM

## 2023-04-30 RX ORDER — FOLIC ACID 1 MG/1
1 TABLET ORAL DAILY
Status: DISCONTINUED | OUTPATIENT
Start: 2023-05-01 | End: 2023-05-04 | Stop reason: HOSPADM

## 2023-04-30 RX ORDER — OLANZAPINE 5 MG/1
5-10 TABLET, ORALLY DISINTEGRATING ORAL EVERY 6 HOURS PRN
Status: DISCONTINUED | OUTPATIENT
Start: 2023-04-30 | End: 2023-05-04 | Stop reason: HOSPADM

## 2023-04-30 RX ORDER — FAMOTIDINE 20 MG/1
20 TABLET, FILM COATED ORAL 2 TIMES DAILY
Status: DISCONTINUED | OUTPATIENT
Start: 2023-04-30 | End: 2023-05-04 | Stop reason: HOSPADM

## 2023-04-30 RX ORDER — HALOPERIDOL 5 MG/ML
2.5-5 INJECTION INTRAMUSCULAR EVERY 6 HOURS PRN
Status: DISCONTINUED | OUTPATIENT
Start: 2023-04-30 | End: 2023-05-04 | Stop reason: HOSPADM

## 2023-04-30 RX ORDER — THIAMINE HYDROCHLORIDE 100 MG/ML
100 INJECTION, SOLUTION INTRAMUSCULAR; INTRAVENOUS ONCE
Status: COMPLETED | OUTPATIENT
Start: 2023-04-30 | End: 2023-04-30

## 2023-04-30 RX ORDER — LORAZEPAM 1 MG/1
1-2 TABLET ORAL EVERY 30 MIN PRN
Status: DISCONTINUED | OUTPATIENT
Start: 2023-04-30 | End: 2023-05-04 | Stop reason: HOSPADM

## 2023-04-30 RX ORDER — TRAZODONE HYDROCHLORIDE 50 MG/1
50 TABLET, FILM COATED ORAL
Status: DISCONTINUED | OUTPATIENT
Start: 2023-04-30 | End: 2023-05-04 | Stop reason: HOSPADM

## 2023-04-30 RX ORDER — SODIUM CHLORIDE 9 MG/ML
INJECTION, SOLUTION INTRAVENOUS CONTINUOUS
Status: DISCONTINUED | OUTPATIENT
Start: 2023-04-30 | End: 2023-05-03

## 2023-04-30 RX ORDER — IOPAMIDOL 755 MG/ML
80 INJECTION, SOLUTION INTRAVASCULAR ONCE
Status: COMPLETED | OUTPATIENT
Start: 2023-04-30 | End: 2023-04-30

## 2023-04-30 RX ORDER — LORAZEPAM 1 MG/1
2 TABLET ORAL ONCE
Status: COMPLETED | OUTPATIENT
Start: 2023-04-30 | End: 2023-04-30

## 2023-04-30 RX ORDER — LORAZEPAM 1 MG/1
1 TABLET ORAL ONCE
Status: COMPLETED | OUTPATIENT
Start: 2023-04-30 | End: 2023-04-30

## 2023-04-30 RX ORDER — FOLIC ACID 1 MG/1
1 TABLET ORAL DAILY
Status: DISCONTINUED | OUTPATIENT
Start: 2023-04-30 | End: 2023-04-30

## 2023-04-30 RX ADMIN — GABAPENTIN 100 MG: 100 CAPSULE ORAL at 20:00

## 2023-04-30 RX ADMIN — TRAZODONE HYDROCHLORIDE 50 MG: 50 TABLET ORAL at 23:37

## 2023-04-30 RX ADMIN — CLONIDINE HYDROCHLORIDE 0.1 MG: 0.1 TABLET ORAL at 16:24

## 2023-04-30 RX ADMIN — LORAZEPAM 1 MG: 1 TABLET ORAL at 10:55

## 2023-04-30 RX ADMIN — LORAZEPAM 2 MG: 1 TABLET ORAL at 14:45

## 2023-04-30 RX ADMIN — FOLIC ACID 1 MG: 1 TABLET ORAL at 13:10

## 2023-04-30 RX ADMIN — SODIUM CHLORIDE 1000 ML: 9 INJECTION, SOLUTION INTRAVENOUS at 13:06

## 2023-04-30 RX ADMIN — ACETAMINOPHEN 325MG 325 MG: 325 TABLET ORAL at 20:00

## 2023-04-30 RX ADMIN — IOPAMIDOL 80 ML: 755 INJECTION, SOLUTION INTRAVENOUS at 11:24

## 2023-04-30 RX ADMIN — SODIUM CHLORIDE 1000 ML: 9 INJECTION, SOLUTION INTRAVENOUS at 10:55

## 2023-04-30 RX ADMIN — MULTIPLE VITAMINS W/ MINERALS TAB 1 TABLET: TAB at 16:24

## 2023-04-30 RX ADMIN — SODIUM CHLORIDE, PRESERVATIVE FREE: 5 INJECTION INTRAVENOUS at 16:15

## 2023-04-30 RX ADMIN — MAGNESIUM SULFATE HEPTAHYDRATE 2 G: 40 INJECTION, SOLUTION INTRAVENOUS at 12:44

## 2023-04-30 RX ADMIN — LORAZEPAM 1 MG: 1 TABLET ORAL at 16:25

## 2023-04-30 RX ADMIN — FAMOTIDINE 20 MG: 20 TABLET, FILM COATED ORAL at 20:00

## 2023-04-30 RX ADMIN — SODIUM CHLORIDE 1000 ML: 9 INJECTION, SOLUTION INTRAVENOUS at 13:56

## 2023-04-30 RX ADMIN — THIAMINE HYDROCHLORIDE 100 MG: 100 INJECTION, SOLUTION INTRAMUSCULAR; INTRAVENOUS at 13:10

## 2023-04-30 ASSESSMENT — ACTIVITIES OF DAILY LIVING (ADL)
ADLS_ACUITY_SCORE: 42
ADLS_ACUITY_SCORE: 35
ADLS_ACUITY_SCORE: 35
ADLS_ACUITY_SCORE: 38
ADLS_ACUITY_SCORE: 35
ADLS_ACUITY_SCORE: 42
ADLS_ACUITY_SCORE: 38

## 2023-04-30 NOTE — PROGRESS NOTES
XCOVER NOTE    Called for GERD - start pepcid, Insomnia - pt states melatonin gives restless legs, does well with trazodone, Pain/whole body, previous - ?etioogy, will give trial low dose tylenol prn and gabapentin    -continue monitor  -day team to follow    Jaquelin Bergeron MD

## 2023-04-30 NOTE — ED PROVIDER NOTES
EMERGENCY DEPARTMENT ENCOUNTER      NAME: Freda Alanis  AGE: 48 year old female  YOB: 1975  MRN: [id number]  EVALUATION DATE & TIME: 2023  9:56 AM    PCP: No Ref-Primary, Physician    ED PROVIDER: Aguilar Wilson M.D.      Chief Complaint   Patient presents with     Numbness     Pain     Hypertension         FINAL IMPRESSION:  1. Generalized muscle weakness    2. Dehydration    3. Hypomagnesemia    4. Abnormal liver function test          ED COURSE & MEDICAL DECISION MAKIN year old female presents to the Emergency Department for evaluation of generalized weakness and inability to ambulate.  She reports that she has been feeling sick for the last few days and increasingly weak.  She is recovering from a gastrointestinal illness last week with nausea vomiting and diarrhea which are no longer present.  She also drinks alcohol fairly frequently and heavily, with last drink about 2 days prior to arrival here.  She is mildly tachycardic diaphoretic and somewhat hypertensive when she arrives to the emergency department.  She underwent an extensive lab and imaging evaluation as below.  She was started immediately on some IV fluids totaling 2 L here, her heart rate did improve with this.  I was suspicious for at least some component of alcohol withdrawal on arrival given the hypotension tachycardia and with her history.  She was given some Ativan here, her blood pressure improved as did her heart rate with these interventions.  Labs are notable for a mild lactic acidosis probably consistent with dehydration.  There is no apparent focus of infection right now so I am not going to administer any empiric antibiotics.  She was given IV fluids as above.  Also replaced some mild hypomagnesemia.  Patient was persistently unable to ambulate here, she has a slow shuffling gait and appears quite unsteady, requiring assist of at least 1 person to maintain stability.  This is significantly off of her  baseline for the last couple of weeks.  She does not seem to have any other focal neurological deficits.  Her head CT here is negative for any sequelae of recent stroke or hemorrhage.  Also obtained a CT abdomen pelvis given the palpable right sided abdominal mass, she has significant hepatic steatosis and hepatic enlargement but no other acute inflammatory process.  Liver function tests are mildly elevated here consistent with a probably alcohol induced liver injury.  Given her persistent inability to ambulate despite initial interventions, we discussed admission to the hospital and patient was in agreement with this.  Discussed case with hospitalist Dr. Morales.    At the conclusion of the encounter I discussed the results of all of the tests and the disposition. The questions were answered. The patient or family acknowledged understanding and was agreeable with the care plan.           MEDICATIONS GIVEN IN THE EMERGENCY:  Medications   0.9% sodium chloride BOLUS (1,000 mLs Intravenous $New Bag 4/30/23 1306)   0.9% sodium chloride BOLUS (has no administration in time range)   folic acid (FOLVITE) tablet 1 mg (1 mg Oral $Given 4/30/23 1310)   0.9% sodium chloride BOLUS (0 mLs Intravenous Stopped 4/30/23 1243)   LORazepam (ATIVAN) tablet 1 mg (1 mg Oral $Given 4/30/23 1055)   sodium chloride (PF) 0.9% PF flush 50 mL (50 mLs Intravenous $Given 4/30/23 1124)   iopamidol (ISOVUE-370) solution 80 mL (80 mLs Intravenous $Given 4/30/23 1124)   magnesium sulfate 2 g in 50 mL sterile water intermittent infusion (2 g Intravenous $New Bag 4/30/23 1244)   thiamine (B-1) injection 100 mg (100 mg Intravenous $Given 4/30/23 1310)       NEW PRESCRIPTIONS STARTED AT TODAY'S ER VISIT  New Prescriptions    No medications on file          =================================================================    HPI    Patient information was obtained from: Patient        Freda Alains is a 48 year old female with a pertinent history of  alcohol abuse, anxiety, depression who presents to this ED today for evaluation of generalized weakness.  She reports was sick a couple of weeks ago with an illness including nausea vomiting and diarrhea.  She says that the symptoms have improved but over the last couple of days she has been noticing increased weakness in her arms and legs.  She drinks alcohol, reports a couple of glasses a day at least daily, last drink was a couple of days ago.  For the last few days she has been progressively weak to the point that her  is carrying her around the house and helping her anytime she needs to get up out of bed.  She denies any focal weakness but says this is more diffuse.  She indicates some pain up and down her entire back without any localization.  She has not fallen recently or sustained any injury.  She denies abdominal pain or current nausea.  She denies urinary symptoms.  Denies ongoing diarrhea.      REVIEW OF SYSTEMS   All systems reviewed and negative except as noted in HPI.    PAST MEDICAL HISTORY:  Past Medical History:   Diagnosis Date     Anxiety and depression      Depressive disorder      Malignant neoplasm (H)     BCC       PAST SURGICAL HISTORY:  Past Surgical History:   Procedure Laterality Date     ENT SURGERY      tonsillectomy     MOHS MICROGRAPHIC PROCEDURE       ORTHOPEDIC SURGERY      Left ankle reconstruction           CURRENT MEDICATIONS:    Current Facility-Administered Medications   Medication     0.9% sodium chloride BOLUS     0.9% sodium chloride BOLUS     folic acid (FOLVITE) tablet 1 mg     Current Outpatient Medications   Medication     loratadine (CLARITIN) 10 MG tablet     multivitamin, therapeutic (THERA-VIT) TABS tablet     naproxen (NAPROSYN) 500 MG tablet         ALLERGIES:  Allergies   Allergen Reactions     Codeine Hives     Codeine Phosphate      Gluten Meal Diarrhea     Lactose Diarrhea     Sulfa Antibiotics        FAMILY HISTORY:  No family history on file.    SOCIAL  HISTORY:   Social History     Socioeconomic History     Marital status:    Tobacco Use     Smoking status: Every Day     Years: 10.00     Types: Cigarettes     Start date: 1/1/1995     Smokeless tobacco: Never   Substance and Sexual Activity     Alcohol use: Yes     Comment: daily- wine/liquor      Drug use: No     Sexual activity: Yes     Partners: Male     Birth control/protection: None       VITALS:  /81   Pulse 101   Temp 99.1  F (37.3  C) (Oral)   Resp 20   LMP 02/28/2023 (Approximate)   SpO2 98%     PHYSICAL EXAM    Constitutional: Chronically ill-appearing middle-age female patient, sitting back in bed, anxious appearing but no acute distress  HENT: Normocephalic, Atraumatic. Neck Supple.  Eyes: EOMI, Conjunctiva normal.  Respiratory: Breathing comfortably on room air. Speaks full sentences easily. Lungs clear to ascultation.  Cardiovascular: Normal heart rate, Regular rhythm. No peripheral edema.  Abdomen: Soft, nontender.  Palpable mass versus liver edge extending to the mid abdomen on the right.  Musculoskeletal: Good range of motion in all major joints. No major deformities noted.  Integument: Warm, diaphoretic.  Neurologic: Awake, alert, oriented.  Face is symmetric.  Speech is normal.  Cranial nerves II through XII are grossly intact.  Strength is 5 out of 5 and symmetric in the bilateral upper extremities with  strength, elbow flexion extension, shoulder abduction, and in the lower extremities with hip flexion knee extension and dorsiflexion and plantarflexion.  Sensation to light touch intact x4.  Gross coordination is preserved  Psychiatric: Cooperative. Affect appropriate.     LAB:  All pertinent labs reviewed and interpreted.  Labs Ordered and Resulted from Time of ED Arrival to Time of ED Departure   COMPREHENSIVE METABOLIC PANEL - Abnormal       Result Value    Sodium 139      Potassium 4.0      Chloride 97 (*)     Carbon Dioxide (CO2) 27      Anion Gap 15      Urea Nitrogen  5.7 (*)     Creatinine 0.45 (*)     Calcium 10.6 (*)     Glucose 113 (*)     Alkaline Phosphatase 241 (*)      (*)      (*)     Protein Total 7.5      Albumin 4.4      Bilirubin Total 0.5      GFR Estimate >90     LACTIC ACID WHOLE BLOOD - Abnormal    Lactic Acid 2.1 (*)    MAGNESIUM - Abnormal    Magnesium 1.6 (*)    PROCALCITONIN - Abnormal    Procalcitonin 0.14 (*)    CBC WITH PLATELETS AND DIFFERENTIAL - Abnormal    WBC Count 11.3 (*)     RBC Count 4.28      Hemoglobin 14.9      Hematocrit 42.4      MCV 99      MCH 34.8 (*)     MCHC 35.1      RDW 12.0      Platelet Count 353      % Neutrophils 69      % Lymphocytes 18      % Monocytes 10      % Eosinophils 1      % Basophils 1      % Immature Granulocytes 1      NRBCs per 100 WBC 0      Absolute Neutrophils 7.8      Absolute Lymphocytes 2.0      Absolute Monocytes 1.2      Absolute Eosinophils 0.1      Absolute Basophils 0.1      Absolute Immature Granulocytes 0.1      Absolute NRBCs 0.0     LIPASE - Normal    Lipase 21     TSH WITH FREE T4 REFLEX - Normal    TSH 1.42     ETHYL ALCOHOL LEVEL - Normal    Alcohol ethyl <0.01     HCG QUANTITATIVE PREGNANCY - Normal    hCG Quantitative 1     INFLUENZA A/B, RSV, & SARS-COV2 PCR - Normal    Influenza A PCR Negative      Influenza B PCR Negative      RSV PCR Negative      SARS CoV2 PCR Negative     ROUTINE UA WITH MICROSCOPIC REFLEX TO CULTURE   LACTIC ACID WHOLE BLOOD       RADIOLOGY:  Reviewed all pertinent imaging. Please see official radiology report.  CT Head w Contrast   Final Result   IMPRESSION:   No acute intracranial abnormality.      TRAVIS OQUENDO MD            SYSTEM ID:  [id number]      CT Abdomen Pelvis w Contrast   Final Result   IMPRESSION:   1.  Marked hepatomegaly with heterogeneous enhancement, consistent   with steatohepatitis.   2.  Cholelithiasis without cholecystitis.      TRAVIS OQUENDO MD            SYSTEM ID:  [id number]          EKG:    Performed at: 1025    Impression: Sinus  tachycardia, low voltage QRS, age-indeterminate inferior and anterior infarct patterns    Rate: 104  Rhythm: Sinus  Axis: Normal  NC Interval: 174  QRS Interval: 86  QTc Interval: 462  ST Changes: None significant  Comparison: None available    I have independently reviewed and interpreted the EKG(s) documented above.        Aguilar Wilson M.D.  Emergency Medicine  [facility]  [address] [place] [id number]  [phone]  Dept: [phone]       Aguilar Wilson MD  04/30/23 9212

## 2023-04-30 NOTE — ED TRIAGE NOTES
"C/o numbness in hands and legs off and on for 2 months. Reports she had an illness about 2 weeks ago that included vomiting, diarrhea, and acid reflux. States was not seen or tested during that illness. C/o pain that starts in neck and goes down back and into legs. Denies headache. Denies any falls or injuries. States back pain feels like muscle pain \"like I pulled something.\" States is usually a daily drinker with last drink \"maybe 2 days ago.\" States the reason she is here today is because she can't really get around much and her  has been having to help her move around. Changed into gown. Monitors in place. Call light within reach. Dr. Wilson at bedside.      Triage Assessment     Row Name 04/30/23 0959       Triage Assessment (Adult)    Airway WDL WDL       Respiratory WDL    Respiratory WDL WDL              "

## 2023-04-30 NOTE — ED NOTES
Dr. Wilson informed that patient's blood pressure is increasing again, has moderate tremors, and has a CIWA score of 11. Dr. Wilson states he will order oral Ativan again as it worked well earlier today.

## 2023-04-30 NOTE — H&P
[facility]    History and Physical  Hospitalist       Date of Admission:  4/30/2023  Date of Service (when I saw the patient): 04/30/23    Assessment & Plan   Freda Alanis is a 48 year old female who presents with generalized weakness, inability to ambulate.    Generalized weakness: Multifactorial, including dehydration, alcohol abuse, in addition to anxiety/depression.  -PT consult  -Supportive care    Anxiety/depression: Patient does not take any psychiatric medications as an outpatient.  She states that she has not seen behavioral health specialist for many years.  -Monitor, will consider getting behavioral health consult prior to discharge.  Outpatient referral certainly an option as well    Transaminitis: Likely due to alcohol abuse.  CT abdomen pelvis reveals steatosis.  -We will trend    Alcohol abuse: Last drink patient states was 2 days ago.  Patient denies history of any withdrawal symptoms.  -Multivitamin replacement  -Monitor for signs of withdrawal    Tobacco abuse: Nicotine replacement as needed    FEN: Slight hypercalcemia, likely due to dehydration.  Slight hypomagnesemia, will replace.  -Oral diet as tolerated    Clinically Significant Risk Factors Present on Admission           # Hypercalcemia: Highest Ca = 10.6 mg/dL in last 2 days, will monitor as appropriate  # Hypomagnesemia: Lowest Mg = 1.6 mg/dL in last 2 days, will replace as needed                      DVT Prophylaxis: Low Risk/Ambulatory with no VTE prophylaxis indicated    Code Status: Full Code    Disposition: Expected discharge in 1-2 days once clinically improved.    Raul Morales MD, MD        Primary Care Physician   Physician No Ref-Primary    Chief Complaint   Generalized weakness    History is obtained from the patient    History of Present Illness   Freda Alanis is a 48 year old female with a history of anxiety depression, alcohol abuse, tobacco abuse who presents with generalized weakness and inability  to ambulate.  Patient states this happened about 3 days ago.  Prior to that however patient has been ill with some sort of gastrointestinal illness, which she describes more resembling heartburn, along with fevers and chills.  She states that this has resolved, however 3 days ago started having generalized weakness, to the point where she needed to be helped around by her .  She does admit to heavy alcohol abuse, last drink was approximately 2 days ago.  She denies any sort of withdrawal symptoms in the past, including seizures, DTs.  Otherwise, she states that she has generalized pain.  She feels as if her hands and feet are numb, weak, and cites balance and coordination difficulties.  She has not noticed 1 side is worse than the other.  She has had no other constitutional symptoms, no shortness of breath, no chest pain, no diarrhea or vomiting.  In the emergency department, work-up revealed mild transaminitis and perhaps some dehydration, but no acute cause of weakness identified.  CT head negative.  CT abdomen pelvis revealed steatosis of the liver, otherwise unremarkable.  Due to her inability to ambulate, patient is admitted for observation.        Past Medical History    I have reviewed this patient's medical history and updated it with pertinent information if needed.   Past Medical History:   Diagnosis Date     Anxiety and depression      Depressive disorder      Malignant neoplasm (H)     BCC       Past Surgical History   I have reviewed this patient's surgical history and updated it with pertinent information if needed.  Past Surgical History:   Procedure Laterality Date     ENT SURGERY      tonsillectomy     MOHS MICROGRAPHIC PROCEDURE       ORTHOPEDIC SURGERY      Left ankle reconstruction       Prior to Admission Medications   Prior to Admission Medications   Prescriptions Last Dose Informant Patient Reported? Taking?   loratadine (CLARITIN) 10 MG tablet  Self Yes No   Sig: Take 10 mg by mouth  daily   multivitamin, therapeutic (THERA-VIT) TABS tablet  Self Yes No   Sig: Take 1 tablet by mouth daily   naproxen (NAPROSYN) 500 MG tablet   No No   Sig: Take 1 tablet (500 mg) by mouth 2 times daily (with meals)      Facility-Administered Medications: None     Allergies   Allergies   Allergen Reactions     Codeine Hives     Codeine Phosphate      Gluten Meal Diarrhea     Lactose Diarrhea     Sulfa Antibiotics        Social History   I have reviewed this patient's social history and updated it with pertinent information if needed. Freda Alanis  reports that she has been smoking cigarettes. She started smoking about 28 years ago. She has never used smokeless tobacco. She reports current alcohol use. She reports that she does not use drugs.    Family History   I have reviewed this patient's family history and updated it with pertinent information if needed.   No family history on file.    Review of Systems   The 10 point Review of Systems is negative other than noted in the HPI or here.    Physical Exam   Temp: 99.1  F (37.3  C) Temp src: Oral BP: 147/81 Pulse: 101   Resp: 20 SpO2: 98 % O2 Device: None (Room air)    Vital Signs with Ranges  Temp:  [99.1  F (37.3  C)] 99.1  F (37.3  C)  Pulse:  [100-107] 101  Resp:  [20] 20  BP: (147-192)/() 147/81  SpO2:  [96 %-98 %] 98 %  0 lbs 0 oz    Constitutional: AA, emotional distress  Eyes: PERRLA, no injection, no icterus  HEENT: atraumatic, normocephalic  Respiratory: CTA b/l  Cardiovascular: S1 S2 RRR, somewhat tachycardic  GI: soft, NT, ND, + bowel sounds  Lymph/Hematologic: no palpable lymphadenopathy  Skin: no rashes, no lesions  Musculoskeletal: 2+ lower extremity pitting edema, good tone, no deformities  Neurologic: oriented x 3, no focal deficits  Psychiatric: Distressed affect    Data   Data reviewed today:  I personally reviewed imaging reports.  Recent Labs   Lab 04/30/23  1037   WBC 11.3*   HGB 14.9   MCV 99         POTASSIUM 4.0    CHLORIDE 97*   CO2 27   BUN 5.7*   CR 0.45*   ANIONGAP 15   CESARIO 10.6*   *   ALBUMIN 4.4   PROTTOTAL 7.5   BILITOTAL 0.5   ALKPHOS 241*   *   *   LIPASE 21     Lactic Acid   Date Value Ref Range Status   04/30/2023 1.5 0.7 - 2.0 mmol/L Final   04/30/2023 2.1 (H) 0.7 - 2.0 mmol/L Final       Recent Results (from the past 24 hour(s))   CT Abdomen Pelvis w Contrast    Narrative    EXAM: CT ABDOMEN PELVIS W CONTRAST 4/30/2023 11:25 AM    HISTORY: Generalized weakness, palpable mass versus liver edge in  right upper quadrant, longstanding history of alcohol abuse.    COMPARISON: No relevant prior comparison available for review.    TECHNIQUE:   Imaging protocol: Computed tomography of the abdomen and pelvis with  imaging acquired after administration of intravenous contrast. Meds  given: 80 mL Isovue-370.  Acquisition: This CT exam was performed using one or more the  following dose reduction techniques: automated exposure control,  adjustment of the mA and/or kV according to patient size, and/or  iterative reconstruction technique.    FINDINGS:    LOWER CHEST:  Bibasilar atelectasis. No suspicious pulmonary nodules or masses.    ABDOMEN/PELVIS:  LIVER: Heterogeneous patchy hypoattenuation of the hepatic parenchyma  with hepatomegaly. Liver measures at least 23 cm in maximal  craniocaudal dimension.  GALLBLADDER: Cholelithiasis without cholecystitis.  BILE DUCTS: No biliary tract dilatation.  PANCREAS: Within normal limits.  SPLEEN: Within normal limits.  ADRENALS: Within normal limits.    KIDNEYS/URETERS: Within normal limits.  URINARY BLADDER: Within normal limits.  REPRODUCTIVE ORGANS: No pelvic masses.    BOWEL: No bowel dilatation or wall thickening. Normal appendix.  Moderate to large volume of stool in the colon.  PERITONEUM/RETROPERITONEUM: No free air. Small physiologic volume of  free fluid in the pelvis.  VESSELS: Within normal limits. Main portal vein is not dilated,  measures up to 1.1  cm.  LYMPH NODES: There are no pathologically enlarged lymph nodes.    BONES AND SOFT TISSUES:  No suspicious osseous lesions. Degenerative periarticular changes and  spinal spondylosis.      Impression    IMPRESSION:  1.  Marked hepatomegaly with heterogeneous enhancement, consistent  with steatohepatitis.  2.  Cholelithiasis without cholecystitis.    TRAVIS OQUENDO MD         SYSTEM ID:  [id number]   CT Head w Contrast    Narrative    EXAM: CT HEAD W/ CONTRAST 4/30/2023 12:35 PM    PROVIDED HISTORY: Weakness, inability to ambulate    COMPARISON: Head 7/19/2011    TECHNIQUE:   Imaging protocol: Multiplanar CT images of the head with intravenous  contrast. 80 mL Isovue 370 administered intravenously.  Acquisition: This CT exam was performed using one or more the  following dose reduction techniques: automated exposure control,  adjustment of the mA and/or kV according to patient size, and/or  iterative reconstruction technique.    FINDINGS:  No intracranial hemorrhage, mass effect, or midline shift. The  ventricles are proportionate to the cerebral sulci. No acute loss of  gray-white matter differentiation.     Vasculature appears hyperdense, consistent with recent administration  of intravenous contrast. Postcontrast images demonstrate no areas of  abnormal intraaxial or extraaxial contrast enhancement.     No acute osseous abnormality. Metallic streak artifact from left  earring noted, partially limiting evaluation of the adjacent tissues.  No paranasal sinus mucosal thickening. Mastoid air cells are clear.  The orbits are grossly unremarkable.       Impression    IMPRESSION:  No acute intracranial abnormality.    TRAVIS OQUENDO MD         SYSTEM ID:  [id number]

## 2023-04-30 NOTE — ED NOTES
Resting in bed looking at menu and very tearful. Patient has been tearful often since arriving in the ER. States she gets very anxious and does not like being in the hospital. SpO2 dips when crying due to patient will hold her breath to try and stop herself from crying. Reminded to breathe and states she doesn't want to make noise while crying.  Has been very pleasant and cooperative. 2nd lactic acid drawn from IV after 10 ml waste due to patient's anxiety with needles. Tolerated well. Denies pain. Water and warm blanket given per request. Denies any other wants or needs at this time.

## 2023-04-30 NOTE — PLAN OF CARE
"Reason for hospital stay:  Abnormal Liver Function  Living situation PTA: home w/   Most recent vitals: BP (!) 170/104   Pulse 97   Temp 98.8  F (37.1  C) (Tympanic)   Resp 22   Ht 1.651 m (5' 5\")   Wt 65.5 kg (144 lb 6.4 oz)   LMP 02/28/2023 (Approximate)   SpO2 98%   BMI 24.03 kg/m      Pain Management:  rates pain @ 8-9/10,   LOC:  A&O x 4  Cardiac: tachycardic, rhythm regular, Elevated BP due to withdrawal  Respiratory:  Lungs clear in upper loabesw/ fine crackles in bases bilaterally.  GI:  Normoactive BS, large firm BM this shift.   :  Voids spontaneously w/o difficulty  Skin Issues:  Some scattered bruising noted. Skin is dry and patient stated that she has eczema.    IVF:  NS @ 100 mL/hr  ABX:  N/A    Nutrition: Regular diet w/ excellent appetite.  Activity: Assist x 2 w/ gait belt and walker  Safety:  Bed in lowest position with wheels locked and alarms in place. Call light and personal items within reach and uses call light appropriately.  Face to face report given with opportunity to observe patient.    Report given to LINCOLN Mendes RN   4/30/2023  7:23 PM        "

## 2023-04-30 NOTE — ED NOTES
Bed: ED02  Expected date: 4/30/23  Expected time:   Means of arrival:   Comments:  [facility]- 48F pain and numbness mouth and neck down.

## 2023-05-01 ENCOUNTER — APPOINTMENT (OUTPATIENT)
Dept: MRI IMAGING | Facility: HOSPITAL | Age: 48
End: 2023-05-01
Attending: INTERNAL MEDICINE
Payer: COMMERCIAL

## 2023-05-01 ENCOUNTER — APPOINTMENT (OUTPATIENT)
Dept: OCCUPATIONAL THERAPY | Facility: HOSPITAL | Age: 48
End: 2023-05-01
Attending: INTERNAL MEDICINE
Payer: COMMERCIAL

## 2023-05-01 ENCOUNTER — APPOINTMENT (OUTPATIENT)
Dept: PHYSICAL THERAPY | Facility: HOSPITAL | Age: 48
End: 2023-05-01
Attending: INTERNAL MEDICINE
Payer: COMMERCIAL

## 2023-05-01 LAB
ALBUMIN SERPL BCG-MCNC: 3.1 G/DL (ref 3.5–5.2)
ALP SERPL-CCNC: 150 U/L (ref 35–104)
ALT SERPL W P-5'-P-CCNC: 91 U/L (ref 10–35)
ANION GAP SERPL CALCULATED.3IONS-SCNC: 8 MMOL/L (ref 7–15)
AST SERPL W P-5'-P-CCNC: 65 U/L (ref 10–35)
BILIRUB SERPL-MCNC: 0.7 MG/DL
BUN SERPL-MCNC: 4 MG/DL (ref 6–20)
CALCIUM SERPL-MCNC: 8 MG/DL (ref 8.6–10)
CHLORIDE SERPL-SCNC: 104 MMOL/L (ref 98–107)
CREAT SERPL-MCNC: 0.47 MG/DL (ref 0.51–0.95)
DEPRECATED HCO3 PLAS-SCNC: 25 MMOL/L (ref 22–29)
ERYTHROCYTE [DISTWIDTH] IN BLOOD BY AUTOMATED COUNT: 12.1 % (ref 10–15)
GFR SERPL CREATININE-BSD FRML MDRD: >90 ML/MIN/1.73M2
GLUCOSE SERPL-MCNC: 104 MG/DL (ref 70–99)
HCT VFR BLD AUTO: 32.9 % (ref 35–47)
HGB BLD-MCNC: 11.5 G/DL (ref 11.7–15.7)
MAGNESIUM SERPL-MCNC: 1.8 MG/DL (ref 1.7–2.3)
MCH RBC QN AUTO: 35.4 PG (ref 26.5–33)
MCHC RBC AUTO-ENTMCNC: 35 G/DL (ref 31.5–36.5)
MCV RBC AUTO: 101 FL (ref 78–100)
PLATELET # BLD AUTO: 262 10E3/UL (ref 150–450)
POTASSIUM SERPL-SCNC: 3.6 MMOL/L (ref 3.4–5.3)
PROT SERPL-MCNC: 5.5 G/DL (ref 6.4–8.3)
RBC # BLD AUTO: 3.25 10E6/UL (ref 3.8–5.2)
SODIUM SERPL-SCNC: 137 MMOL/L (ref 136–145)
WBC # BLD AUTO: 7.4 10E3/UL (ref 4–11)

## 2023-05-01 PROCEDURE — G0378 HOSPITAL OBSERVATION PER HR: HCPCS

## 2023-05-01 PROCEDURE — 250N000013 HC RX MED GY IP 250 OP 250 PS 637: Performed by: INTERNAL MEDICINE

## 2023-05-01 PROCEDURE — 97162 PT EVAL MOD COMPLEX 30 MIN: CPT | Mod: GP | Performed by: PHYSICAL THERAPIST

## 2023-05-01 PROCEDURE — 99244 OFF/OP CNSLTJ NEW/EST MOD 40: CPT | Mod: 95 | Performed by: STUDENT IN AN ORGANIZED HEALTH CARE EDUCATION/TRAINING PROGRAM

## 2023-05-01 PROCEDURE — A9585 GADOBUTROL INJECTION: HCPCS | Performed by: RADIOLOGY

## 2023-05-01 PROCEDURE — 250N000013 HC RX MED GY IP 250 OP 250 PS 637: Performed by: FAMILY MEDICINE

## 2023-05-01 PROCEDURE — 83735 ASSAY OF MAGNESIUM: CPT | Performed by: INTERNAL MEDICINE

## 2023-05-01 PROCEDURE — 85014 HEMATOCRIT: CPT | Performed by: INTERNAL MEDICINE

## 2023-05-01 PROCEDURE — 36415 COLL VENOUS BLD VENIPUNCTURE: CPT | Performed by: INTERNAL MEDICINE

## 2023-05-01 PROCEDURE — 80053 COMPREHEN METABOLIC PANEL: CPT | Performed by: INTERNAL MEDICINE

## 2023-05-01 PROCEDURE — 258N000003 HC RX IP 258 OP 636: Performed by: INTERNAL MEDICINE

## 2023-05-01 PROCEDURE — 70553 MRI BRAIN STEM W/O & W/DYE: CPT

## 2023-05-01 PROCEDURE — 97530 THERAPEUTIC ACTIVITIES: CPT | Mod: GO

## 2023-05-01 PROCEDURE — 97165 OT EVAL LOW COMPLEX 30 MIN: CPT | Mod: GO

## 2023-05-01 PROCEDURE — 255N000002 HC RX 255 OP 636: Performed by: RADIOLOGY

## 2023-05-01 PROCEDURE — 250N000013 HC RX MED GY IP 250 OP 250 PS 637: Performed by: STUDENT IN AN ORGANIZED HEALTH CARE EDUCATION/TRAINING PROGRAM

## 2023-05-01 PROCEDURE — 99232 SBSQ HOSP IP/OBS MODERATE 35: CPT | Performed by: INTERNAL MEDICINE

## 2023-05-01 RX ORDER — IBUPROFEN 200 MG
200-400 TABLET ORAL DAILY PRN
COMMUNITY

## 2023-05-01 RX ORDER — GADOBUTROL 604.72 MG/ML
7.5 INJECTION INTRAVENOUS ONCE
Status: COMPLETED | OUTPATIENT
Start: 2023-05-01 | End: 2023-05-01

## 2023-05-01 RX ORDER — DIPHENHYDRAMINE HCL 25 MG
25-50 CAPSULE ORAL
COMMUNITY

## 2023-05-01 RX ORDER — IBUPROFEN 200 MG
400 TABLET ORAL EVERY 6 HOURS PRN
Status: DISCONTINUED | OUTPATIENT
Start: 2023-05-01 | End: 2023-05-04 | Stop reason: HOSPADM

## 2023-05-01 RX ORDER — POLYETHYLENE GLYCOL 3350 17 G/17G
17 POWDER, FOR SOLUTION ORAL DAILY
Status: DISCONTINUED | OUTPATIENT
Start: 2023-05-01 | End: 2023-05-04 | Stop reason: HOSPADM

## 2023-05-01 RX ORDER — LORAZEPAM 1 MG/1
1 TABLET ORAL ONCE
Status: COMPLETED | OUTPATIENT
Start: 2023-05-01 | End: 2023-05-01

## 2023-05-01 RX ADMIN — SERTRALINE HYDROCHLORIDE 50 MG: 50 TABLET ORAL at 15:11

## 2023-05-01 RX ADMIN — TRAZODONE HYDROCHLORIDE 50 MG: 50 TABLET ORAL at 21:03

## 2023-05-01 RX ADMIN — GABAPENTIN 100 MG: 100 CAPSULE ORAL at 04:32

## 2023-05-01 RX ADMIN — FAMOTIDINE 20 MG: 20 TABLET, FILM COATED ORAL at 08:27

## 2023-05-01 RX ADMIN — GABAPENTIN 100 MG: 100 CAPSULE ORAL at 21:00

## 2023-05-01 RX ADMIN — GADOBUTROL 7.5 ML: 604.72 INJECTION INTRAVENOUS at 11:42

## 2023-05-01 RX ADMIN — ACETAMINOPHEN 325MG 325 MG: 325 TABLET ORAL at 21:00

## 2023-05-01 RX ADMIN — Medication 100 MG: at 08:27

## 2023-05-01 RX ADMIN — SODIUM CHLORIDE, PRESERVATIVE FREE: 5 INJECTION INTRAVENOUS at 11:58

## 2023-05-01 RX ADMIN — LORAZEPAM 1 MG: 1 TABLET ORAL at 10:41

## 2023-05-01 RX ADMIN — SODIUM CHLORIDE, PRESERVATIVE FREE: 5 INJECTION INTRAVENOUS at 01:46

## 2023-05-01 RX ADMIN — FAMOTIDINE 20 MG: 20 TABLET, FILM COATED ORAL at 21:00

## 2023-05-01 RX ADMIN — ACETAMINOPHEN 325MG 325 MG: 325 TABLET ORAL at 12:05

## 2023-05-01 RX ADMIN — IBUPROFEN 400 MG: 200 TABLET, FILM COATED ORAL at 08:27

## 2023-05-01 RX ADMIN — MULTIPLE VITAMINS W/ MINERALS TAB 1 TABLET: TAB at 08:27

## 2023-05-01 RX ADMIN — POLYETHYLENE GLYCOL 3350 17 G: 17 POWDER, FOR SOLUTION ORAL at 12:11

## 2023-05-01 RX ADMIN — FOLIC ACID 1 MG: 1 TABLET ORAL at 08:27

## 2023-05-01 RX ADMIN — SODIUM CHLORIDE, PRESERVATIVE FREE: 5 INJECTION INTRAVENOUS at 21:03

## 2023-05-01 RX ADMIN — GABAPENTIN 100 MG: 100 CAPSULE ORAL at 12:05

## 2023-05-01 RX ADMIN — IBUPROFEN 400 MG: 200 TABLET, FILM COATED ORAL at 21:00

## 2023-05-01 RX ADMIN — ACETAMINOPHEN 325MG 325 MG: 325 TABLET ORAL at 02:00

## 2023-05-01 ASSESSMENT — ACTIVITIES OF DAILY LIVING (ADL)
ADLS_ACUITY_SCORE: 42
ADLS_ACUITY_SCORE: 42
ADLS_ACUITY_SCORE: 39
DEPENDENT_IADLS:: INDEPENDENT
ADLS_ACUITY_SCORE: 42
PREVIOUS_RESPONSIBILITIES: MEAL PREP;HOUSEKEEPING;LAUNDRY;MEDICATION MANAGEMENT
ADLS_ACUITY_SCORE: 42
ADLS_ACUITY_SCORE: 42
ADLS_ACUITY_SCORE: 39
ADLS_ACUITY_SCORE: 39
ADLS_ACUITY_SCORE: 42
ADLS_ACUITY_SCORE: 39

## 2023-05-01 NOTE — CONSULTS
"[facility] PSYCHIATRY   CONSULT     ADMISSION DATA     Freda Alanis MRN# [id number]   Age: 48 year old YOB: 1975     Date of Admission: 4/30/2023  Primary Physician: No Ref-Primary, Physician        REASON FOR CONSULT     Patient request: depression and anxiety        HISTORY OF PRESENT ILLNESS     Review of records shows the patient was admitted for generalized weakness and inability to ambulate with her having multiple contributing causes including alcohol abuse.    The patient was interviewed at bedside. She reports that her mental health has overall been going alright. She notes that she has had some episodes of depression. She notes that she is experiencing panic attacks. She notes that she gets an overwhelming fear that there is no hope and that \"everything is being lost.\" She notes feeling like she is \"losing control.\" She notes that she will have panic attacks that will last less than 30 minutes. She notes other panic attacks that last more than a couple of hours, being a day or so. She notes having trouble thinking, labored breathing, pressure sensation, and sense of being hopeless. She notes that these are occurring a couple of times a week. She notes that she worries about having panic attacks. She notes that she adjusts her life because of it. She notes that it negatively impacts her life.    She notes that she has not been drinking as much. She notes that she drinks every day. She notes that she drinks white wine, vodka, or gin. She notes that she typically drinks has mixed drinks. She notes that she will have 1-2 at a time. She notes that she will have around 1 shot in a drink. She notes that on the weekends she will drink more than this. She notes that it has been a long time since she partied on the weekend.    She notes that she has been struggling with depression at times. She notes that that her relationship with food is alright. She notes that those conditions are going " fortinoht.    She denies any SI or HI. The patient feels safe to return home. She is not interested in any CD resources.    She consents to starting Zoloft after discussing B/R/SE, including sedation, GI side effects, headaches, sexual side effects, and dizziness.       PSYCHIATRIC HISTORY     Past diagnoses include MDD, BPD, LUIS, alcohol use disorder, and bulimia/binge eating disorder. Medication trials include Cymbalta, Naltrexone, Gabapentin, Desyrel, Vistaril, BuSpar, and Topamax. No history of suicide attempts. History of therapy and medication management, last being Dr. Jean, last seeing in 2017.        SUBSTANCE USE HISTORY   History   Drug Use No       Social History    Substance and Sexual Activity      Alcohol use: Yes        Comment: daily- wine/liquor       History   Smoking Status     Every Day     Years: 10.00     Types: Cigarettes     Start date: 1/1/1995   Smokeless Tobacco     Never     Alcohol- long term history of abuse. Periods of daily use. History of CD treatment (12 years ago). No history of withdrawal or complicated withdrawal.        SOCIAL HISTORY   Social History     Socioeconomic History     Marital status:      Spouse name: Not on file     Number of children: Not on file     Years of education: Not on file     Highest education level: Not on file   Occupational History     Not on file   Tobacco Use     Smoking status: Every Day     Years: 10.00     Types: Cigarettes     Start date: 1/1/1995     Smokeless tobacco: Never   Vaping Use     Vaping status: Not on file   Substance and Sexual Activity     Alcohol use: Yes     Comment: daily- wine/liquor      Drug use: No     Sexual activity: Yes     Partners: Male     Birth control/protection: None   Other Topics Concern     Parent/sibling w/ CABG, MI or angioplasty before 65F 55M? Not Asked   Social History Narrative     Not on file     Social Determinants of Health     Financial Resource Strain: Not on file   Food Insecurity: Not on  file   Transportation Needs: Not on file   Physical Activity: Not on file   Stress: Not on file   Social Connections: Not on file   Intimate Partner Violence: Not on file   Housing Stability: Not on file     Lives in [place], [place]. . 2 grown sons from a previous marriage. History of being . Stays at home.  is a , retiring, currently driving semi trucks.       FAMILY HISTORY   No family history on file.      PAST MEDICAL HISTORY   Past Medical History:   Diagnosis Date     Anxiety and depression      Depressive disorder      Malignant neoplasm (H)     BCC       Past Surgical History:   Procedure Laterality Date     ENT SURGERY      tonsillectomy     MOHS MICROGRAPHIC PROCEDURE       ORTHOPEDIC SURGERY      Left ankle reconstruction       Codeine, Gluten meal, Lactose, and Sulfa antibiotics     MEDICATIONS   No current outpatient medications on file.        PHYSICAL EXAM/ROS     I have reviewed the physical exam as documented by the medical team, Dr. Morales and agree with findings and assessment and have no additional findings to add at this time. The review of systems is negative other than noted in the HPI.       LABS   Recent Results (from the past 24 hour(s))   Comprehensive metabolic panel    Collection Time: 04/30/23 10:37 AM   Result Value Ref Range    Sodium 139 136 - 145 mmol/L    Potassium 4.0 3.4 - 5.3 mmol/L    Chloride 97 (L) 98 - 107 mmol/L    Carbon Dioxide (CO2) 27 22 - 29 mmol/L    Anion Gap 15 7 - 15 mmol/L    Urea Nitrogen 5.7 (L) 6.0 - 20.0 mg/dL    Creatinine 0.45 (L) 0.51 - 0.95 mg/dL    Calcium 10.6 (H) 8.6 - 10.0 mg/dL    Glucose 113 (H) 70 - 99 mg/dL    Alkaline Phosphatase 241 (H) 35 - 104 U/L     (H) 10 - 35 U/L     (H) 10 - 35 U/L    Protein Total 7.5 6.4 - 8.3 g/dL    Albumin 4.4 3.5 - 5.2 g/dL    Bilirubin Total 0.5 <=1.2 mg/dL    GFR Estimate >90 >60 mL/min/1.73m2   Lipase    Collection Time: 04/30/23 10:37 AM   Result Value Ref Range    Lipase 21  13 - 60 U/L   Lactic acid whole blood    Collection Time: 04/30/23 10:37 AM   Result Value Ref Range    Lactic Acid 2.1 (H) 0.7 - 2.0 mmol/L   Magnesium    Collection Time: 04/30/23 10:37 AM   Result Value Ref Range    Magnesium 1.6 (L) 1.7 - 2.3 mg/dL   TSH with free T4 reflex    Collection Time: 04/30/23 10:37 AM   Result Value Ref Range    TSH 1.42 0.30 - 4.20 uIU/mL   Procalcitonin    Collection Time: 04/30/23 10:37 AM   Result Value Ref Range    Procalcitonin 0.14 (H) <0.05 ng/mL   Alcohol ethyl    Collection Time: 04/30/23 10:37 AM   Result Value Ref Range    Alcohol ethyl <0.01 <=0.01 g/dL   HCG quantitative pregnancy (blood)    Collection Time: 04/30/23 10:37 AM   Result Value Ref Range    hCG Quantitative 1 <5 mIU/mL   CBC with platelets and differential    Collection Time: 04/30/23 10:37 AM   Result Value Ref Range    WBC Count 11.3 (H) 4.0 - 11.0 10e3/uL    RBC Count 4.28 3.80 - 5.20 10e6/uL    Hemoglobin 14.9 11.7 - 15.7 g/dL    Hematocrit 42.4 35.0 - 47.0 %    MCV 99 78 - 100 fL    MCH 34.8 (H) 26.5 - 33.0 pg    MCHC 35.1 31.5 - 36.5 g/dL    RDW 12.0 10.0 - 15.0 %    Platelet Count 353 150 - 450 10e3/uL    % Neutrophils 69 %    % Lymphocytes 18 %    % Monocytes 10 %    % Eosinophils 1 %    % Basophils 1 %    % Immature Granulocytes 1 %    NRBCs per 100 WBC 0 <1 /100    Absolute Neutrophils 7.8 1.6 - 8.3 10e3/uL    Absolute Lymphocytes 2.0 0.8 - 5.3 10e3/uL    Absolute Monocytes 1.2 0.0 - 1.3 10e3/uL    Absolute Eosinophils 0.1 0.0 - 0.7 10e3/uL    Absolute Basophils 0.1 0.0 - 0.2 10e3/uL    Absolute Immature Granulocytes 0.1 <=0.4 10e3/uL    Absolute NRBCs 0.0 10e3/uL   Phosphorus    Collection Time: 04/30/23 10:37 AM   Result Value Ref Range    Phosphorus 3.6 2.5 - 4.5 mg/dL   Extra Blue Top Tube    Collection Time: 04/30/23 10:39 AM   Result Value Ref Range    Hold Specimen JIC    Extra Red Top Tube    Collection Time: 04/30/23 10:39 AM   Result Value Ref Range    Hold Specimen JIC    Symptomatic  Influenza A/B, RSV, & SARS-CoV2 PCR (COVID-19) Nasopharyngeal    Collection Time: 04/30/23 12:47 PM    Specimen: Nasopharyngeal; Swab   Result Value Ref Range    Influenza A PCR Negative Negative    Influenza B PCR Negative Negative    RSV PCR Negative Negative    SARS CoV2 PCR Negative Negative   Lactic acid whole blood    Collection Time: 04/30/23  1:54 PM   Result Value Ref Range    Lactic Acid 1.5 0.7 - 2.0 mmol/L   UA with Microscopic reflex to Culture    Collection Time: 04/30/23  2:35 PM    Specimen: Urine, Clean Catch   Result Value Ref Range    Color Urine Light Yellow Colorless, Straw, Light Yellow, Yellow    Appearance Urine Clear Clear    Glucose Urine Negative Negative mg/dL    Bilirubin Urine Negative Negative    Ketones Urine Negative Negative mg/dL    Specific Gravity Urine 1.020 1.003 - 1.035    Blood Urine Negative Negative    pH Urine 6.5 4.7 - 8.0    Protein Albumin Urine 10 (A) Negative mg/dL    Urobilinogen Urine Normal Normal, 2.0 mg/dL    Nitrite Urine Negative Negative    Leukocyte Esterase Urine Negative Negative    Bacteria Urine Few (A) None Seen /HPF    Mucus Urine Present (A) None Seen /LPF    RBC Urine 1 <=2 /HPF    WBC Urine 3 <=5 /HPF    Squamous Epithelials Urine 7 (H) <=1 /HPF   Comprehensive metabolic panel    Collection Time: 05/01/23  5:10 AM   Result Value Ref Range    Sodium 137 136 - 145 mmol/L    Potassium 3.6 3.4 - 5.3 mmol/L    Chloride 104 98 - 107 mmol/L    Carbon Dioxide (CO2) 25 22 - 29 mmol/L    Anion Gap 8 7 - 15 mmol/L    Urea Nitrogen 4.0 (L) 6.0 - 20.0 mg/dL    Creatinine 0.47 (L) 0.51 - 0.95 mg/dL    Calcium 8.0 (L) 8.6 - 10.0 mg/dL    Glucose 104 (H) 70 - 99 mg/dL    Alkaline Phosphatase 150 (H) 35 - 104 U/L    AST 65 (H) 10 - 35 U/L    ALT 91 (H) 10 - 35 U/L    Protein Total 5.5 (L) 6.4 - 8.3 g/dL    Albumin 3.1 (L) 3.5 - 5.2 g/dL    Bilirubin Total 0.7 <=1.2 mg/dL    GFR Estimate >90 >60 mL/min/1.73m2   CBC with platelets    Collection Time: 05/01/23  5:10 AM  "  Result Value Ref Range    WBC Count 7.4 4.0 - 11.0 10e3/uL    RBC Count 3.25 (L) 3.80 - 5.20 10e6/uL    Hemoglobin 11.5 (L) 11.7 - 15.7 g/dL    Hematocrit 32.9 (L) 35.0 - 47.0 %     (H) 78 - 100 fL    MCH 35.4 (H) 26.5 - 33.0 pg    MCHC 35.0 31.5 - 36.5 g/dL    RDW 12.1 10.0 - 15.0 %    Platelet Count 262 150 - 450 10e3/uL   Magnesium    Collection Time: 05/01/23  5:10 AM   Result Value Ref Range    Magnesium 1.8 1.7 - 2.3 mg/dL         MENTAL STATUS EXAM   Vitals: /97 (BP Location: Right arm, Patient Position: Chair)   Pulse 92   Temp 98.8  F (37.1  C) (Tympanic)   Resp 18   Ht 1.651 m (5' 5\")   Wt 65.5 kg (144 lb 6.4 oz)   LMP 02/28/2023 (Approximate)   SpO2 96%   BMI 24.03 kg/m      Appearance: Alert, oriented, dressed in hospital scrubs  Attitude: Cooperative   Eye Contact: Fair  Mood: \"Anxious\"  Affect: Restricted range of affect, mood congruent  Speech: Normal rate. Normal rhythm   Psychomotor Behavior: No tremor, rigidity, akathisia, or psychomotor retardation    Thought Process: Logical, goal directed   Associations: No loose associations   Thought Content: Denies SI. No SIB. Denies AVH. No evidence of delusional thought.  Insight: Fair   Judgment: Fair  Oriented to: Person, place, and time  Attention Span and Concentration: Intact  Recent and Remote Memory: Intact  Language: English with appropriate syntax and vocabulary  Fund of Knowledge: Average  Muscle Strength and Tone: Grossly normal  Gait and Station: Did not observe due to lying in bed       ASSESSMENT     This is a 48 year old female with a PMH of MDD, LUIS, AUD, BPD, and an eating disorder who presented to the hospital with generalized weakness occurring in the setting of daily alcohol use. The patient is being evaluated for mental health concerns namely anxiety, as she has done for years without treatment. She meets criteria for panic disorder with her depression mostly being in remission. She is engaging in daily drinking, " with this likely contributing to her mental health concerns. She is willing to cut down or quit but is not interested in resources. She has previously been treatment with medications with Zoloft being started to address the issues below. Suspect she has grown out of BPD and that her eating disorder is in remission. Recommend psychotherapy with the patient to consider. Also, recommend exercise with time to help with physical and mental health.        DIAGNOSES     1. Major depressive disorder, recurrent, in partial remission  2. Generalized anxiety disorder  3. Panic disorder  4. History of BPD  5. History of an eating disorder (bulimia and anorexia)  6. Alcohol use disorder, mild to moderate in severity       RECOMMENDATIONS     Medical management per primary team.  Start Zoloft 50 mg daily to address depression and anxiety. Please provide with a 30 day supply with 1 refill.  Recommend stopping alcohol with patient to pursue on her own. Not interested in resources like a Rule 25.   Recommend outpatient psychotherapy to address depression and anxiety.  Patient to follow-up with PCP as can manage medications.  Patient psychiatrically stable for discharge when determined by medical team.    Thank you for the consult. Psychiatry consult service will sing off. Please contact us with any questions or concerns.       ATTESTATION      Dr. Byron Smiley  Psychiatrist     VIDEO VISIT    Patient has given verbal consent for video visit?: Yes     Video- Visit Details  Type of service:  video visit for mental health treatment.  Time of service:  Date:  05/01/2023  Video Start Time: 245PM      Video End Time: 315PM    Reason for video visit: COVID-19 [place]  Originating Site (patient location):  [facility]  Distant Site (provider location):  Remote location  Mode of Communication:  Video Conference via [a]list games

## 2023-05-01 NOTE — PROGRESS NOTES
Care Transitions note:       05/01/23 1100   General Information   Assessment completed with: Patient   Person spoke with Freda   Type of CM/SW Visit Initial Assessment   Primary Care Provider verified and updated as needed? Yes  (no PCP)   Readmission Within the Last 30 Days no previous admission in last 30 days   Reason for Consult discharge planning   Advance Care Planning   Decision Making Considerations patient/ for healthcare needs;patient/family ability to make health care decisions   Living Environment   People in Home spouse   Name(s) of People in Home Lei   Current Living Arrangements house   Care Provided by self   Provides Care For pet(s)   Able to Return to Prior Arrangements no   Living Arrangement Comments Short term rehab currently recommended   Assessment of Family/Social Support   Marital Status    Who is your support system?    Spouse's Name Lei   Description of Support System Involved;Supportive   Support Assessment Adequate family and caregiver support   Employment/   Employment Status unemployed   Financial/Legal   Source of Income other (see comments)  ('s income)   Referral to Financial Worker No   Who Manages Finances if Patient Unable shared   Assessment of Functional Status   Dependent ADLs: Transfers   Dependent IADLs: Independent   Assesssment of Functional Status Needs placement in a SNF/TCF for rehabilitation   Mental Status   Mental Health Status Current Concern   Mental Health Management Other (see comment)  (not connected)   Chemical Dependency Status Current Concern  (goes through 1 pack of cigarettes every 3 days, couple of drinks daily- last drink Friday. denies additional substance use)   Chemical Dependency Management Previous treatment   Intellectual Performance WDL   Level of Consciousness alert  (tearful)   Discharge Needs Assessment   Patient/Family Anticipates Transition to home with help/services;nursing home   Equipment  Currently Used at Home none   Patient/Family Anticipated Services at Transition rehabilitation services;durable medical equipment;home health care   Transportation Anticipated family or friend will provide   Transportation Concerns none   Concerns to be Addressed discharge planning   Plan   Plan Return home with support vs short term rehab   Patient/Family in Agreement with Plan yes   CM/SW Discharge Planning   Anticipated Discharge Disposition (CM/SW) Skilled Nursing Facility;Home Care   Offered/Gave Vendor List yes     Pt/family was provided with the Medicare Compare list for SNF.  Discussed associated Medicare star ratings to assist with choice for referrals/discharge planning Yes    Education was given to pt/family that star ratings are updated/maintained by Medicare and can be reviewed by visiting www.medicare.gov Yes    Patient/family choices for facility in priority are:   First Choice : Skilled Nursing Facility [facility]    [phone]    Second Choice: Skilled Nursing Facility [facility]: [facility]     [phone]    Third Choice: Skilled Nursing Facility Micki: [facility]         [phone]    Will need to see which facility is in network with Freda' commercial insurance.

## 2023-05-01 NOTE — PLAN OF CARE
"Reason for Hospital Stay: Abnormal liver function test  Isolation: None  IV Fluids: LR @ 100 mL/hr  Antibiotics: None  LDAs: IV to left AC - infusing    Most recent vitals: /82 (BP Location: Right arm, Patient Position: Semi-Lawrence's, Cuff Size: Adult Regular)   Pulse 95   Temp 98.6  F (37  C) (Tympanic)   Resp 18   Ht 1.651 m (5' 5\")   Wt 65.5 kg (144 lb 6.4 oz)   LMP 02/28/2023 (Approximate)   SpO2 92%   BMI 24.03 kg/m    Pain: Reported generalized pain rated 4-7/10  Pain Interventions: PRN tylenol and gabapentin given x2    Orientation & LOC: A&O x4  CIWAs: 4, 3, & 3  Respiratory: WDL  - O2: None  Cardiac: WDL  -Telemetry: Not present  PNV: Extremities are warm with no discoloration - generalized numbness/tingling reported; pedal pulses +2  GI: Abdomen is distended/rounded and nontender; UQ & RLQ firm on palpation; bowel sounds are audible & normoactive x4; no c/o N&V but patient did report some constipation  : WDL  -Spence: Not present  Skin: As charted     Diet: Regular diet  I/O: As charted    Activity: Ax2 with gait belt & walker to BSC  Safety: Bed is low & locked, alarms are on, and non-slip footwear are on when patient is OOB. Personal items and all light are within reach. Patient calls appropriately and makes her needs known.    Comments: Received order for PO ibuprofen to help relieve pain. Patient would like to take with breakfast - day shift RN informed.     Face to face report given with opportunity to observe patient.    Report given to LINCOLN Matias RN   5/1/2023  "

## 2023-05-01 NOTE — PROGRESS NOTES
[facility]    Hospitalist Progress Note    Date of Service (when I saw the patient): 05/01/2023    Assessment & Plan   Freda Alanis is a 48 year old female who was admitted on 4/30/2023.    Generalized weakness/ataxia:  Resulting in inability to walk.  Multifactorial, including dehydration, alcohol abuse, in addition to anxiety/depression.  Consideration of cerebellar ataxia, Wernicke's due to alcohol abuse.  -appreciate PT consult  -Supportive care  -MVI replacement  -Will get MRI of brain     Anxiety/depression: Patient does not take any psychiatric medications as an outpatient.  She states that she has not seen behavioral health specialist for many years.  -Patient did request behavioral health consultation prior to discharge     Transaminitis: Likely due to alcohol abuse.  CT abdomen pelvis reveals steatosis.  -Trending down     Alcohol abuse: Last drink patient states was 2 days ago.  Patient denies history of any withdrawal symptoms.  -Multivitamin replacement  -Monitor for signs of withdrawal     Tobacco abuse: Nicotine replacement as needed     FEN: Slight hypercalcemia, likely due to dehydration.  Slight hypomagnesemia, replaced  -Oral diet as tolerated       Clinically Significant Risk Factors Present on Admission           # Hypercalcemia: Highest Ca = 10.6 mg/dL in last 2 days, will monitor as appropriate  # Hypomagnesemia: Lowest Mg = 1.6 mg/dL in last 2 days, will replace as needed   # Hypoalbuminemia: Lowest albumin = 3.1 g/dL at 5/1/2023  5:10 AM, will monitor as appropriate                    DVT Prophylaxis: Low Risk/Ambulatory with no VTE prophylaxis indicated    Code Status: Full Code    Disposition: Expected discharge .    Raul Morales MD, MD        Interval History   Patient seen in room.  Alert, appropriately interactive, tearful most of the time.  No acute events overnight, did complain of diffuse pain.  She got as needed Flexeril, ibuprofen.  Appears in no physical  distress.    -Data reviewed today: I reviewed all new labs and imaging results over the last 24 hours. I personally reviewed imaging reports.    Physical Exam   Temp: 98.8  F (37.1  C) Temp src: Tympanic BP: 158/97 Pulse: 92   Resp: 18 SpO2: 96 % O2 Device: None (Room air)    Vitals:    04/30/23 1539   Weight: 65.5 kg (144 lb 6.4 oz)     Vital Signs with Ranges  Temp:  [98.3  F (36.8  C)-99.1  F (37.3  C)] 98.8  F (37.1  C)  Pulse:  [] 92  Resp:  [15-22] 18  BP: (128-192)/() 158/97  SpO2:  [90 %-98 %] 96 %    Intake/Output Summary (Last 24 hours) at 5/1/2023 0904  Last data filed at 5/1/2023 0146  Gross per 24 hour   Intake 4430 ml   Output --   Net 4430 ml       Peripheral IV 04/30/23 Left Antecubital fossa (Active)   Site Assessment WDL 04/30/23 2339   Line Status Infusing 04/30/23 2339   Dressing Transparent 04/30/23 2339   Dressing Status clean;dry;intact 04/30/23 2339   Dressing Intervention New dressing  04/30/23 1036   Line Intervention Flushed 04/30/23 1539   Phlebitis Scale 0-->no symptoms 04/30/23 2339   Infiltration? no 04/30/23 2339   Number of days: 1     No line/device    Constitutional: AA, persistent emotional distress  Eyes: PERRLA, no injection, no icterus  HEENT: atraumatic, normocephalic  Respiratory: CTA b/l  Cardiovascular: S1 S2 RRR, somewhat tachycardic  GI: soft, NT, ND, + bowel sounds  Lymph/Hematologic: no palpable lymphadenopathy  Skin: no rashes, no lesions  Musculoskeletal: 2+ lower extremity pitting edema, good tone, no deformities  Neurologic: oriented x 3, no focal deficits  Psychiatric: Distressed affect      Medications     sodium chloride 100 mL/hr at 05/01/23 0146       famotidine  20 mg Oral BID     folic acid  1 mg Oral Daily     multivitamin w/minerals  1 tablet Oral Daily     sodium chloride (PF)  3 mL Intracatheter Q8H     thiamine  100 mg Oral Daily       Data   Recent Labs   Lab 05/01/23  0510 04/30/23  1037   WBC 7.4 11.3*   HGB 11.5* 14.9   * 99   PLT  262 353    139   POTASSIUM 3.6 4.0   CHLORIDE 104 97*   CO2 25 27   BUN 4.0* 5.7*   CR 0.47* 0.45*   ANIONGAP 8 15   CESARIO 8.0* 10.6*   * 113*   ALBUMIN 3.1* 4.4   PROTTOTAL 5.5* 7.5   BILITOTAL 0.7 0.5   ALKPHOS 150* 241*   ALT 91* 158*   AST 65* 128*   LIPASE  --  21     Lactic Acid   Date Value Ref Range Status   04/30/2023 1.5 0.7 - 2.0 mmol/L Final   04/30/2023 2.1 (H) 0.7 - 2.0 mmol/L Final       Recent Results (from the past 24 hour(s))   CT Abdomen Pelvis w Contrast    Narrative    EXAM: CT ABDOMEN PELVIS W CONTRAST 4/30/2023 11:25 AM    HISTORY: Generalized weakness, palpable mass versus liver edge in  right upper quadrant, longstanding history of alcohol abuse.    COMPARISON: No relevant prior comparison available for review.    TECHNIQUE:   Imaging protocol: Computed tomography of the abdomen and pelvis with  imaging acquired after administration of intravenous contrast. Meds  given: 80 mL Isovue-370.  Acquisition: This CT exam was performed using one or more the  following dose reduction techniques: automated exposure control,  adjustment of the mA and/or kV according to patient size, and/or  iterative reconstruction technique.    FINDINGS:    LOWER CHEST:  Bibasilar atelectasis. No suspicious pulmonary nodules or masses.    ABDOMEN/PELVIS:  LIVER: Heterogeneous patchy hypoattenuation of the hepatic parenchyma  with hepatomegaly. Liver measures at least 23 cm in maximal  craniocaudal dimension.  GALLBLADDER: Cholelithiasis without cholecystitis.  BILE DUCTS: No biliary tract dilatation.  PANCREAS: Within normal limits.  SPLEEN: Within normal limits.  ADRENALS: Within normal limits.    KIDNEYS/URETERS: Within normal limits.  URINARY BLADDER: Within normal limits.  REPRODUCTIVE ORGANS: No pelvic masses.    BOWEL: No bowel dilatation or wall thickening. Normal appendix.  Moderate to large volume of stool in the colon.  PERITONEUM/RETROPERITONEUM: No free air. Small physiologic volume of  free  fluid in the pelvis.  VESSELS: Within normal limits. Main portal vein is not dilated,  measures up to 1.1 cm.  LYMPH NODES: There are no pathologically enlarged lymph nodes.    BONES AND SOFT TISSUES:  No suspicious osseous lesions. Degenerative periarticular changes and  spinal spondylosis.      Impression    IMPRESSION:  1.  Marked hepatomegaly with heterogeneous enhancement, consistent  with steatohepatitis.  2.  Cholelithiasis without cholecystitis.    TRAVIS OQUENDO MD         SYSTEM ID:  [id number]   CT Head w Contrast    Narrative    EXAM: CT HEAD W/ CONTRAST 4/30/2023 12:35 PM    PROVIDED HISTORY: Weakness, inability to ambulate    COMPARISON: Head 7/19/2011    TECHNIQUE:   Imaging protocol: Multiplanar CT images of the head with intravenous  contrast. 80 mL Isovue 370 administered intravenously.  Acquisition: This CT exam was performed using one or more the  following dose reduction techniques: automated exposure control,  adjustment of the mA and/or kV according to patient size, and/or  iterative reconstruction technique.    FINDINGS:  No intracranial hemorrhage, mass effect, or midline shift. The  ventricles are proportionate to the cerebral sulci. No acute loss of  gray-white matter differentiation.     Vasculature appears hyperdense, consistent with recent administration  of intravenous contrast. Postcontrast images demonstrate no areas of  abnormal intraaxial or extraaxial contrast enhancement.     No acute osseous abnormality. Metallic streak artifact from left  earring noted, partially limiting evaluation of the adjacent tissues.  No paranasal sinus mucosal thickening. Mastoid air cells are clear.  The orbits are grossly unremarkable.       Impression    IMPRESSION:  No acute intracranial abnormality.    TRAVIS OQUENDO MD         SYSTEM ID:  [id number]       Raul Morales MD

## 2023-05-01 NOTE — PROVIDER NOTIFICATION
Tylenol and gabapentin have not relieved patient's pain overnight. Messaged MD. Order received for ibuprofen.

## 2023-05-01 NOTE — PLAN OF CARE
"Blood pressure 166/100, pulse 90, temperature 98.4  F (36.9  C), temperature source Tympanic, resp. rate 18, height 1.651 m (5' 5\"), weight 65.5 kg (144 lb 6.4 oz), last menstrual period 02/28/2023, SpO2 97 %.    A&O, flat, tearful/crying/ anxious at times, pt states scared of everything going on with her health, ciwa 5,4 and 3 this shift, did c/o pain generalized 7-8/10 did give PRN ibuprofenx1, tylenolx1 and gabax1 with relief, IV runnings NS at 100ml/hr to L AC, lungs clear, remains on RA, odorous yellow urine output, R abd tender soft, pt c/o constipation MD notified and miralax given, one hard BM after miralax given, slight redness to buttocks, pt did get MRI today, ativan one time dose given pre MRI,  worked with PT/OT, free from falls, Assist 1 with walker and gait belt, unsteady on feet, up in chair most of shift, chair alarm on, makes needs known.    Face to face report given with opportunity to observe patient.    Report given to LICNOLN Santos RN   5/1/2023  7:20 PM      "

## 2023-05-01 NOTE — PROGRESS NOTES
05/01/23 0954   Appointment Info   Signing Clinician's Name / Credentials (PT) Anabella Granadosking, DPT   Living Environment   People in Home spouse   Current Living Arrangements house   Home Accessibility stairs to enter home   Number of Stairs, Main Entrance 3   Stair Railings, Main Entrance none   Transportation Anticipated family or friend will provide   Living Environment Comments Pt lives with her spouse, he does drive so pt is home alone at times   Self-Care   Usual Activity Tolerance moderate   Current Activity Tolerance fair   Equipment Currently Used at Home none   Fall history within last six months no   Activity/Exercise/Self-Care Comment Pt states for the past several days she has been needing assist with all ADLs, she states she has also needed to hold onto her spouse for any mobilizing around her home.  She reports she has not left her house for the past 6 months or so.   General Information   Onset of Illness/Injury or Date of Surgery 04/30/23   Referring Physician Dr. Morales   Patient/Family Therapy Goals Statement (PT) unable to verbalize goal, states she is unsure what she wants to do   Pertinent History of Current Problem (include personal factors and/or comorbidities that impact the POC) Pt admitted with weakness, abnormal liver function tests, and ETOH abuse   Existing Precautions/Restrictions fall   General Observations Pt up in chair upon PT arrival.  Pt tearful throughout session   Cognition   Affect/Mental Status (Cognition) WFL   Orientation Status (Cognition) oriented x 3   Pain Assessment   Patient Currently in Pain Yes, see Vital Sign flowsheet  (reports 7/10 from her neck down to her toes)   Integumentary/Edema   Integumentary/Edema Comments increased edema   Posture    Posture Forward head position;Protracted shoulders   Range of Motion (ROM)   Range of Motion ROM is WFL   Strength (Manual Muscle Testing)   Strength Comments Grossly 3+/5 throughout hips and knees, noted incoordination and  ataxia with muscle testing   Bed Mobility   Comment, (Bed Mobility) NT, up in chair upon PT arrival   Transfers   Transfers sit-stand transfer   Sit-Stand Transfer   Sit-Stand [place] (Transfers) minimum assist (75% patient effort)   Assistive Device (Sit-Stand Transfers) walker, front-wheeled   Comment, (Sit-Stand Transfer) Pt unsteady with transitioning from sit>stand   Gait/Stairs (Locomotion)   [place] Level (Gait) moderate assist (50% patient effort)   Assistive Device (Gait) walker, front-wheeled   Distance in Feet 30'   Pattern (Gait) step-to   Deviations/Abnormal Patterns (Gait) ataxic   Comment, (Gait/Stairs) Had 1 major LOB with ambulation requiring assist from this writer to prevent falling.  Demonstrates an ataxic gait with ambulation.   Balance   Balance Comments fair- with support   Sensory Examination   Sensory Perception Comments reports bilateral LE neuropathy   Coordination   Coordination Comments impaired   Clinical Impression   Criteria for Skilled Therapeutic Intervention Yes, treatment indicated   PT Diagnosis (PT) gait disturbance   Influenced by the following impairments ataxia, incoordination, weakness, decreased activity tolerance   Functional limitations due to impairments decreased tolerance and safety with functional mobility   Clinical Presentation (PT Evaluation Complexity) Evolving/Changing   Clinical Presentation Rationale clinical judgement   Clinical Decision Making (Complexity) moderate complexity   Planned Therapy Interventions (PT) balance training;bed mobility training;gait training;neuromuscular re-education;patient/family education;stair training;strengthening;transfer training;progressive activity/exercise;risk factor education   Anticipated Equipment Needs at Discharge (PT)   (TBD pending discharge location)   Risk & Benefits of therapy have been explained evaluation/treatment results reviewed;care plan/treatment goals reviewed;risks/benefits reviewed;participants  included;patient   Clinical Impression Comments PT evaluation completed.  Pt lives at home with her spouse, her spouse works so she is home alone at times.  Pt states for the past several days her spouse has had to assist her with all ADLs and provide support for ambulation.  Pt demonstrates weakness and ataxia with functional mobility, requiring mod A for ambulation and remains a high fall risk.  At this time, pt would not be safe to return home without 24 hour physical assist available.  Would recommend short term rehab if mobility does not improve by the time of discharge.  Pt tearful throughout session and more so when discussing discharge plans, did not want to talk about it at this time.  Will see during acute care stay to progress mobility and assist with discharge planning.   PT Total Evaluation Time   PT Eval, Moderate Complexity Minutes (81493) 16   Physical Therapy Goals   PT Frequency 6x/week   PT Predicted Duration/Target Date for Goal Attainment 05/15/23   PT Goals Bed Mobility;Transfers;Gait;Stairs   PT: Bed Mobility Modified independent;Supine to/from sit   PT: Transfers Modified independent;Sit to/from stand;Bed to/from chair;Assistive device   PT: Gait Modified independent;Rolling walker;100 feet   PT: Stairs Minimal assist;3 stairs   PT Discharge Planning   PT Plan Progress functional mobility and strength   PT Discharge Recommendation (DC Rec) Transitional Care Facility;home with assist;home with home care physical therapy   PT Rationale for DC Rec PT evaluation completed.  Pt lives at home with her spouse, her spouse works so she is home alone at times.  Pt states for the past several days her spouse has had to assist her with all ADLs and provide support for ambulation.  Pt demonstrates weakness and ataxia with functional mobility, requiring mod A for ambulation and remains a high fall risk.  At this time, pt would not be safe to return home without 24 hour physical assist available.  Would  recommend short term rehab if mobility does not improve by the time of discharge.  Pt tearful throughout session and more so when discussing discharge plans, did not want to talk about it at this time.  Will see during acute care stay to progress mobility and assist with discharge planning.   PT Brief overview of current status sit>stand min A, ambulated 30' with FWW mod A with major LOB x1 requiring assist to prevent falling, demonstrates ataxic gait   Total Session Time   Total Session Time (sum of timed and untimed services) 16

## 2023-05-01 NOTE — PROGRESS NOTES
05/01/23 1400   Appointment Info   Signing Clinician's Name / Credentials (OT) Fe Tracy, OTR/L   Quick Adds   Quick Adds Certification   Living Environment   People in Home spouse   Current Living Arrangements house   Home Accessibility stairs to enter home   Number of Stairs, Main Entrance 3   Stair Railings, Main Entrance none   Transportation Anticipated family or friend will provide   Living Environment Comments Spouse works daily from 8am-1pm so pt is home alone at times. They also have 4 dogs and just got a new puppy who is about 5 months old. Their bathroom has regular height toilets, no grab bars, and a walk in shower with a bench. Pt occasionally used the bench but also stood at times when bathing.   Self-Care   Usual Activity Tolerance moderate   Current Activity Tolerance fair   Equipment Currently Used at Home other (see comments)  (shower bench)   Fall history within last six months no   Activity/Exercise/Self-Care Comment Pt reports she has required some assistance for ADLs the past few days, but prior to that was independent and did not use an AD.   Instrumental Activities of Daily Living (IADL)   Previous Responsibilities meal prep;housekeeping;laundry;medication management   IADL Comments Pt does most of the cooking but spouse also completes some when he's home. They share cleaning and laundry (spouse carries up/down the stairs). Spouse does the driving and shopping.   General Information   Onset of Illness/Injury or Date of Surgery 04/30/23   Referring Physician Dr. Morales   Patient/Family Therapy Goal Statement (OT) Pt did not verbalize goals. Appears to understand possible need for STR but she became teary-eyed during this conversation   Additional Occupational Profile Info/Pertinent History of Current Problem Pt admitted d/t generalized weakness/ataxia, anxiety/depression, transaminitis, alcohol abuse, tobacco use, and hypercalcemia/hypomagnesia.   Existing Precautions/Restrictions fall  "  General Observations and Info Pt resting in the chair upon OT arrival   Cognitive Status Examination   Orientation Status orientation to person, place and time   Affect/Mental Status (Cognitive) WFL   Follows Commands WNL   Visual Perception   Visual Impairment/Limitations WFL   Pain Assessment   Patient Currently in Pain Yes, see Vital Sign flowsheet  (\"neck down\" 5-6/10; RN aware)   Range of Motion Comprehensive   General Range of Motion bilateral upper extremity ROM WNL   Strength Comprehensive (MMT)   Comment, General Manual Muscle Testing (MMT) Assessment B shoulders grossly 3+/5, B elbows grossly 4/5 (R slightlly stronger than L though pt is R handed), B gross grasp fairly good   Coordination   Fine Motor Coordination B FMC appeared mildly impaired though pt felt it was her baseline   Bed Mobility   Comment (Bed Mobility) NT, pt up in the chair   Transfers   Transfers sit-stand transfer;toilet transfer   Sit-Stand Transfer   Sit-Stand [place] (Transfers) minimum assist (75% patient effort)   Assistive Device (Sit-Stand Transfers) walker, front-wheeled   Toilet Transfer   Type (Toilet Transfer) stand pivot/stand step   [place] (Toilet Transfer) minimum assist (75% patient effort)   Assistive Device (Toilet Transfer) commode chair   Balance   Balance Comments Impaired   Activities of Daily Living   BADL Assessment/Intervention lower body dressing;grooming   Lower Body Dressing Assessment/Training   Position (Lower Body Dressing) other (see comments)  (sitting in the chair)   [place] (Lower Body Dressing) supervision   Grooming Assessment/Training   Position (Grooming) other (see comments)  (sitting in the chair)   [place] (Grooming) set up   Clinical Impression   Criteria for Skilled Therapeutic Interventions Met (OT) Yes, treatment indicated   OT Diagnosis Impaired ADLs   Influenced by the following impairments impaired coordination, weakness, decreased activity tolerance "   Assessment of Occupational Performance 1-3 Performance Deficits   Identified Performance Deficits ADLs   Planned Therapy Interventions (OT) ADL retraining;risk factor education;progressive activity/exercise;strengthening;fine motor coordination training   Clinical Decision Making Complexity (OT) low complexity   Risk & Benefits of therapy have been explained evaluation/treatment results reviewed;care plan/treatment goals reviewed;risks/benefits reviewed;current/potential barriers reviewed;patient;participants included   Clinical Impression Comments OT evaluation completed. Prior to admission, pt lived with her spouse (though he works so pt home alone at times) and is typically independent in ADLs. She has required increased assist the pasts couple days before admission. Pt exhibits generalize weakness, impaired coordination, and limited activity tolerance and therefore is requiring assist to manage ADLs. Pt would not be safe to return home unless she had 24/7 assistance available. If pt does not have anyone to provide this amount of care and does not improve during her acute care stay, she will benefit from SNR for STR. Plan to treat during pt's hopsital stay and will continue to assist with D/C planning.   OT Total Evaluation Time   OT Eval, Low Complexity Minutes (96429) 10   Therapy Certification   Medical Diagnosis weakness   Start of Care Date 05/01/23   Certification date from 05/01/23   Certification date to 05/15/23   OT Goals   Therapy Frequency (OT) 5 times/wk   OT Predicted Duration/Target Date for Goal Attainment 05/15/23   OT Goals Hygiene/Grooming;Lower Body Dressing;Toilet Transfer/Toileting   OT: Hygiene/Grooming modified independent   OT: Lower Body Dressing Modified independent   OT: Toilet Transfer/Toileting Modified independent   Interventions   Interventions Quick Adds Therapeutic Activity   Therapeutic Activities   Therapeutic Activity Minutes (21130) 10   Symptoms noted during/after  treatment fatigue   Treatment Detail/Skilled Intervention Pt urinated in the commode and required totalA for tamera-cares and clothing management after. She was able to transfer back to the chair from the commode with Charlene. Discharge was discussed. Pt did not respond during conversation and became teary-eyed. Pt was left resting in the chair with the call light within reach and clip alert attached.   OT Discharge Planning   OT Plan Progress strength, activity tolerance, and ADLs   OT Discharge Recommendation (DC Rec) Transitional Care Facility;home with assist;home with home care occupational therapy  (24/7 support/assist if she returned home)   OT Rationale for DC Rec OT evaluation completed. Prior to admission, pt lived with her spouse (though he works so pt home alone at times) and is typically independent in ADLs. She has required increased assist the pasts couple days before admission. Pt exhibits generalize weakness, impaired coordination, and limited activity tolerance and therefore is requiring assist to manage ADLs. Pt would not be safe to return home unless she had 24/7 assistance available. If pt does not have anyone to provide this amount of care and does not improve during her acute care stay, she will benefit from SNR for STR. Plan to treat during pt's hopsital stay and will continue to assist with D/C planning.   OT Brief overview of current status Charlene sit<>stand, Charlene chair<>commode, SBA LB dressing in sitting, set up for grooming tasks in sitting, TotalA for toileting tasks   Total Session Time   Timed Code Treatment Minutes 10   Total Session Time (sum of timed and untimed services) 20   I certify the need for these services furnished under this plan of treatment and while under my care. (Physician co-signature of this document indicates review and certification of the therapy plan).

## 2023-05-01 NOTE — MEDICATION SCRIBE - ADMISSION MEDICATION HISTORY
Medication Scribe Admission Medication History    Admission medication history is complete. The information provided in this note is only as accurate as the sources available at the time of the update.    Medication reconciliation/reorder completed by provider prior to medication history? Yes    Information Source(s): Patient and CareEverywhere/SureScripts via in-person    Pertinent Information: patient takes no rx medications at this time    Changes made to PTA medication list:    Added: benadryl, OTC ibu    Deleted: naproxen-therapy completed    Changed: loratadine from scheduled to PRN per pt report    Medication Affordability:  Not including over the counter (OTC) medications, was there a time in the past 12 months when you did not take your medications as prescribed because of cost?: No (no rx medications)    Allergies reviewed with patient and updates made in EHR: yes    Medication History Completed By: Riana Richard 5/1/2023 8:37 AM    Prior to Admission medications    Medication Sig Last Dose Taking? Auth Provider Long Term End Date   diphenhydrAMINE (BENADRYL) 25 MG capsule Take 25-50 mg by mouth at bedtime as needed, may repeat once for allergies or sleep More than a month Yes Reported, Patient     ibuprofen (ADVIL) 200 MG tablet Take 200-400 mg by mouth daily as needed Past Week Yes Reported, Patient     loratadine (CLARITIN) 10 MG tablet Take 10 mg by mouth daily as needed for allergies More than a month Yes Reported, Patient     multivitamin, therapeutic (THERA-VIT) TABS tablet Take 1 tablet by mouth daily Past Month Yes Reported, Patient

## 2023-05-01 NOTE — PLAN OF CARE
[facility] Inpatient Admission Note:    Patient admitted to 3112/3112-1 at approximately 1539 via cart accompanied by transport tech from emergency room . Report received from LINCOLN Lee in SBAR format at 1330 via telephone. Patient pivot transferred to bed via RN and aid. Patient is alert and oriented X 3, reports pain; rates at 9 on 0-10 scale.  Patient oriented to room, unit, hourly rounding, and plan of care. Explained admission packet and patient handbook with patient bill of rights brochure. Will continue to monitor and document as needed.     Inpatient Nursing criteria listed below was met:    Health care directives status obtained and documented: Pt declines    Patient identifies a surrogate decision maker: No   If initial lactic acid greater than 2.0, repeat lactic acid drawn within one hour of arrival to unit: No. If no, state reason: re-draw was done in ER and was 1.5    Clergy visit ordered if patient requests: N/A    Skin issues/needs documented: Yes    Isolation Patient: no  Fall Prevention Yes: Care plan updated, education given and documented, sticker and magnet in place: Yes    Care Plan initiated: Yes    Education Documented (including assessment): Yes    Patient has discharge needs : Yes If yes, please explain:mobility issues

## 2023-05-02 ENCOUNTER — APPOINTMENT (OUTPATIENT)
Dept: PHYSICAL THERAPY | Facility: HOSPITAL | Age: 48
End: 2023-05-02
Payer: COMMERCIAL

## 2023-05-02 ENCOUNTER — APPOINTMENT (OUTPATIENT)
Dept: OCCUPATIONAL THERAPY | Facility: HOSPITAL | Age: 48
End: 2023-05-02
Payer: COMMERCIAL

## 2023-05-02 LAB — MAGNESIUM SERPL-MCNC: 1.8 MG/DL (ref 1.7–2.3)

## 2023-05-02 PROCEDURE — 97530 THERAPEUTIC ACTIVITIES: CPT | Mod: GP,CQ

## 2023-05-02 PROCEDURE — 99231 SBSQ HOSP IP/OBS SF/LOW 25: CPT | Performed by: INTERNAL MEDICINE

## 2023-05-02 PROCEDURE — 83735 ASSAY OF MAGNESIUM: CPT | Performed by: INTERNAL MEDICINE

## 2023-05-02 PROCEDURE — 36415 COLL VENOUS BLD VENIPUNCTURE: CPT | Performed by: INTERNAL MEDICINE

## 2023-05-02 PROCEDURE — 250N000013 HC RX MED GY IP 250 OP 250 PS 637: Performed by: STUDENT IN AN ORGANIZED HEALTH CARE EDUCATION/TRAINING PROGRAM

## 2023-05-02 PROCEDURE — G0378 HOSPITAL OBSERVATION PER HR: HCPCS

## 2023-05-02 PROCEDURE — 97530 THERAPEUTIC ACTIVITIES: CPT | Mod: GO

## 2023-05-02 PROCEDURE — 258N000003 HC RX IP 258 OP 636: Performed by: INTERNAL MEDICINE

## 2023-05-02 PROCEDURE — 250N000013 HC RX MED GY IP 250 OP 250 PS 637: Performed by: INTERNAL MEDICINE

## 2023-05-02 PROCEDURE — 250N000013 HC RX MED GY IP 250 OP 250 PS 637: Performed by: FAMILY MEDICINE

## 2023-05-02 RX ADMIN — FOLIC ACID 1 MG: 1 TABLET ORAL at 08:56

## 2023-05-02 RX ADMIN — SODIUM CHLORIDE, PRESERVATIVE FREE: 5 INJECTION INTRAVENOUS at 16:43

## 2023-05-02 RX ADMIN — LORAZEPAM 1 MG: 1 TABLET ORAL at 23:57

## 2023-05-02 RX ADMIN — GABAPENTIN 100 MG: 100 CAPSULE ORAL at 17:45

## 2023-05-02 RX ADMIN — FAMOTIDINE 20 MG: 20 TABLET, FILM COATED ORAL at 20:17

## 2023-05-02 RX ADMIN — TRAZODONE HYDROCHLORIDE 50 MG: 50 TABLET ORAL at 20:17

## 2023-05-02 RX ADMIN — IBUPROFEN 400 MG: 200 TABLET, FILM COATED ORAL at 17:45

## 2023-05-02 RX ADMIN — FAMOTIDINE 20 MG: 20 TABLET, FILM COATED ORAL at 08:56

## 2023-05-02 RX ADMIN — IBUPROFEN 400 MG: 200 TABLET, FILM COATED ORAL at 08:56

## 2023-05-02 RX ADMIN — MULTIPLE VITAMINS W/ MINERALS TAB 1 TABLET: TAB at 08:56

## 2023-05-02 RX ADMIN — SODIUM CHLORIDE, PRESERVATIVE FREE: 5 INJECTION INTRAVENOUS at 06:40

## 2023-05-02 RX ADMIN — Medication 100 MG: at 08:55

## 2023-05-02 RX ADMIN — ACETAMINOPHEN 325MG 325 MG: 325 TABLET ORAL at 20:17

## 2023-05-02 RX ADMIN — LORAZEPAM 1 MG: 1 TABLET ORAL at 17:45

## 2023-05-02 RX ADMIN — GABAPENTIN 100 MG: 100 CAPSULE ORAL at 08:56

## 2023-05-02 RX ADMIN — ACETAMINOPHEN 325MG 325 MG: 325 TABLET ORAL at 04:53

## 2023-05-02 RX ADMIN — POLYETHYLENE GLYCOL 3350 17 G: 17 POWDER, FOR SOLUTION ORAL at 08:55

## 2023-05-02 RX ADMIN — LORAZEPAM 1 MG: 1 TABLET ORAL at 11:48

## 2023-05-02 RX ADMIN — SERTRALINE HYDROCHLORIDE 50 MG: 50 TABLET ORAL at 08:56

## 2023-05-02 ASSESSMENT — ACTIVITIES OF DAILY LIVING (ADL)
ADLS_ACUITY_SCORE: 42
ADLS_ACUITY_SCORE: 44
ADLS_ACUITY_SCORE: 42
ADLS_ACUITY_SCORE: 45
ADLS_ACUITY_SCORE: 44
ADLS_ACUITY_SCORE: 44

## 2023-05-02 NOTE — PROGRESS NOTES
05/02/23 1000   Appointment Info   Signing Clinician's Name / Credentials (PT) Freda Teague PTA   PT Assistant Visit Number 1   Interventions   Interventions Quick Adds Therapeutic Activity   Therapeutic Activity   Therapeutic Activities: dynamic activities to improve functional performance Minutes (28957) 23   Symptoms Noted During/After Treatment Fatigue   Treatment Detail/Skilled Intervention Pt sitting up in the chair willing to work with PT.  Working on transfers sit to stand with Aof 1 needing verbal cuing to lean forward before standing.  Gait with wheeled walker about 100 feet x2 with one rest break.  Pt very fearful of falling during ambulation.  Pt needing A from wheeled walker for ambulation very unsteady gait pattern.  Continue to encourage ambulation with nursing staff and PT.  Pt will need A from wheeled walker for all ambulation   PT Discharge Planning   PT Plan Progress functional mobility and strength   PT Discharge Recommendation (DC Rec) Transitional Care Facility;home with assist;home with home care physical therapy   PT Brief overview of current status as above   Total Session Time   Timed Code Treatment Minutes 23   Total Session Time (sum of timed and untimed services) 23

## 2023-05-02 NOTE — PROGRESS NOTES
Most recent vitals; /100 (Location; Right arm, Patient position; Sitting, Cuff size ; Adult regular)  Pulse 76  Temp 36.6 C (Tympanic)  Resp 20  SpO2 97 % RA    Pt is A/O, calm and anxious at times.Lung sounds are clear, HR regular. She rates pain of 5/10 in the back, buttocks and legs with generalized tenderness. She has some redness on the buttocks. Skin is dry and intact. She voids frequently without any difficulty. She is on a regular diet and has a good appetite. She is assist of 1 with gait belt and walker.    Patient remains free of falls at this shift and call light within reach.    Face to face report given with opportunity to observe patient.    Report given to Salvador Braga   5/2/2023  1:12 PM

## 2023-05-02 NOTE — PROGRESS NOTES
Declined at [facility] d/t tobacco use.  Declined at [facility].  Updated Freda.  She feels a little more confident in her ability to return home after how she did with PT today.  She identified several individuals that also come out to the house to check on her while her  is at work.  Open to having home care.  Writer did discuss that she would need to be connected with a PCP in order to have this service.  She verbalized understanding and is receptive to doing this.  She is aware that the service cannot start until she has established with a PCP.  Writer will coordinate appointment and home care referral.     Checked in with Lei.  Questions and concerns addressed .

## 2023-05-02 NOTE — PROGRESS NOTES
[facility]    Hospitalist Progress Note    Date of Service (when I saw the patient): 05/02/2023    Assessment & Plan   Freda Alanis is a 48 year old female who was admitted on 4/30/2023.    Generalized weakness/ataxia:  Resulting in inability to walk.  Multifactorial, including dehydration, alcohol abuse, in addition to anxiety/depression.  Consideration of cerebellar ataxia, Wernicke's due to alcohol abuse.  MRI of brain negative for acute pathology.  -appreciate PT/OT consult, patient is borderline needing TCU  -Supportive care  -MVI replacement     Anxiety/depression: Patient does not take any psychiatric medications as an outpatient.  She states that she has not seen behavioral health specialist for many years.  -Appreciate psych consult, Zoloft started     Transaminitis: Likely due to alcohol abuse.  CT abdomen pelvis reveals steatosis.  -Trending down     Alcohol abuse: Last drink patient states was 2 days ago.  Patient denies history of any withdrawal symptoms.  -Multivitamin replacement  -Monitor for signs of withdrawal     Tobacco abuse: Nicotine replacement as needed     FEN: Slight hypercalcemia, likely due to dehydration.  Slight hypomagnesemia, replaced  -Oral diet as tolerated       Clinically Significant Risk Factors Present on Admission           # Hypercalcemia: Highest Ca = 10.6 mg/dL in last 2 days, will monitor as appropriate  # Hypomagnesemia: Lowest Mg = 1.6 mg/dL in last 2 days, will replace as needed   # Hypoalbuminemia: Lowest albumin = 3.1 g/dL at 5/1/2023  5:10 AM, will monitor as appropriate                    DVT Prophylaxis: Low Risk/Ambulatory with no VTE prophylaxis indicated    Code Status: Full Code    Disposition: Expected discharge home versus nursing home depending on functionality.    Raul Morales MD, MD        Interval History   Patient seen in room.  Alert, appropriately interactive.  No distress.  No acute events overnight.  No complaints of pain today.   Appears in no physical distress.    -Data reviewed today: I reviewed all new labs and imaging results over the last 24 hours. I personally reviewed imaging reports.    Physical Exam   Temp: 98.9  F (37.2  C) Temp src: Tympanic BP: 154/82 Pulse: 82   Resp: 18 SpO2: 93 % O2 Device: None (Room air)    Vitals:    04/30/23 1539   Weight: 65.5 kg (144 lb 6.4 oz)     Vital Signs with Ranges  Temp:  [98.2  F (36.8  C)-99.6  F (37.6  C)] 98.9  F (37.2  C)  Pulse:  [82-90] 82  Resp:  [18] 18  BP: (153-166)/() 154/82  SpO2:  [93 %-97 %] 93 %      Intake/Output Summary (Last 24 hours) at 5/2/2023 0821  Last data filed at 5/2/2023 0640  Gross per 24 hour   Intake 3984 ml   Output 4100 ml   Net -116 ml         Peripheral IV 04/30/23 Left Antecubital fossa (Active)   Site Assessment WDL 05/02/23 0740   Line Status Infusing 05/02/23 0740   Dressing Transparent 05/02/23 0740   Dressing Status clean;intact;dry 05/02/23 0740   Dressing Intervention New dressing  04/30/23 1036   Line Intervention Flushed 04/30/23 1539   Phlebitis Scale 0-->no symptoms 05/02/23 0740   Infiltration? no 05/02/23 0457   Number of days: 2     No line/device    Constitutional: AA, persistent emotional distress  Eyes: PERRLA, no injection, no icterus  HEENT: atraumatic, normocephalic  Respiratory: CTA b/l  Cardiovascular: S1 S2 RRR, somewhat tachycardic  GI: soft, NT, ND, + bowel sounds  Lymph/Hematologic: no palpable lymphadenopathy  Skin: no rashes, no lesions  Musculoskeletal: trace LE edema, good tone, no deformities  Neurologic: oriented x 3, no focal deficits  Psychiatric: Distressed affect      Medications     sodium chloride 100 mL/hr at 05/02/23 0640       famotidine  20 mg Oral BID     folic acid  1 mg Oral Daily     multivitamin w/minerals  1 tablet Oral Daily     polyethylene glycol  17 g Oral Daily     sertraline  50 mg Oral Daily     sodium chloride (PF)  3 mL Intracatheter Q8H     sodium chloride (PF)  5 mL Intravenous Q8H     thiamine   100 mg Oral Daily       Data   Recent Labs   Lab 05/01/23  0510 04/30/23  1037   WBC 7.4 11.3*   HGB 11.5* 14.9   * 99    353    139   POTASSIUM 3.6 4.0   CHLORIDE 104 97*   CO2 25 27   BUN 4.0* 5.7*   CR 0.47* 0.45*   ANIONGAP 8 15   CESARIO 8.0* 10.6*   * 113*   ALBUMIN 3.1* 4.4   PROTTOTAL 5.5* 7.5   BILITOTAL 0.7 0.5   ALKPHOS 150* 241*   ALT 91* 158*   AST 65* 128*   LIPASE  --  21     Lactic Acid   Date Value Ref Range Status   04/30/2023 1.5 0.7 - 2.0 mmol/L Final   04/30/2023 2.1 (H) 0.7 - 2.0 mmol/L Final       Recent Results (from the past 24 hour(s))   MR Brain w/o & w Contrast    Narrative    EXAM:  MR BRAIN W/O & W CONTRAST    HISTORY:  ataxia.    TECHNIQUE:  Multiplanar, multisequence MR imaging of the head obtained  prior to, and after, intravenous contrast administration    MEDS/CONTRAST: Gadavist   6.5  mL    COMPARISON:  CT head 4/30/2023, 7/19/2011     FINDINGS:    There is no mass effect, midline shift or evidence of acute  intracranial hemorrhage. The ventricles are proportionate to the  cerebral sulci. Normal major vascular intracranial flow voids.    Postcontrast images demonstrate no abnormal intracranial enhancement.    No suspicious abnormality of the skull marrow signal. No paranasal  sinus mucosal thickening. Mastoid air cells are clear. The orbits are  grossly unremarkable.      Impression    IMPRESSION:  Negative exam. No acute intracranial abnormality. No abnormal  intracranial enhancement.    TRAVIS OQUENDO MD         SYSTEM ID:  [id number]       Raul Morales MD

## 2023-05-02 NOTE — PLAN OF CARE
"Most recent vitals: /86 (BP Location: Right arm, Patient Position: Semi-Lawrence's, Cuff Size: Adult Regular)   Pulse 85   Temp 98.9  F (37.2  C) (Tympanic)   Resp 18   Ht 1.651 m (5' 5\")   Wt 65.5 kg (144 lb 6.4 oz)   LMP 02/28/2023 (Approximate)   SpO2 94%   BMI 24.03 kg/m      Admit hx: From home w/ .  Came to ER for increased weakness & inability to ambulate on 4/30/23.  The week previously she had a GI bug (w/ nausea & vomiting) that she was not seen/treated for.  Daily drinker with last drink being 2 days prior to ER visit.  Also, reports numbness of hands & legs on and off for last 2 months. Head CT & Brain MRI negative.  Abdominal CT showed Cholelithiasis w/o cholecystitis & steatohepatitis.     Dx Hx: Major Depression, Alcohol abuse, Agoraphobia, Eating disorder, Anxiety & depression, Malignant neoplasm    Comments:     A/O, calm, cooperative (flat/anxious @ times), pain 5/10 (back, buttock, extremities) w/ numbness & tingling in extremities.  C/O headache (tylenol given). Lungs clear/diminished, HR regular, Voiding frequently with large amounts. (300-400 mL apx. Q 2 hrs).     CIWA-Ar: 2-3 (mildly anxious, fingertip felt tremor, & headache)    IVF:  mL/hr    Nutrition: Reg. diet  Ambulation: A x 1/2 w/gait belt & walker  Safety: Remains free of falls, uses call light appropriately, bed alarm on    Face to face report given with opportunity to observe patient.    Report given to LINCOLN Villegas RN   5/2/2023  7:40 AM        "

## 2023-05-02 NOTE — PLAN OF CARE
"Reason for hospital stay:  Abnormal Liver Function test  Living situation PTA: Home w/   Most recent vitals:/82 (BP Location: Right arm, Patient Position: Left side, Cuff Size: Adult Regular)   Pulse 91   Temp 98.9  F (37.2  C) (Tympanic)   Resp 18   Ht 1.651 m (5' 5\")   Wt 65.5 kg (144 lb 6.4 oz)   LMP 02/28/2023 (Approximate)   SpO2 98%   BMI 24.03 kg/m    Pain Management:  Ibuprofen, Gabapentin this shift  LOC:  A&O x 4  Cardiac:  HRR regular.  Respiratory:  Lungs clear in the upper lobes w/ fine crackles in the bases bilaterally.  GI:  Normoactive BS x 4.  :  Voids spontaneously w/o difficulty-frequency  Skin Issues: Some scattered bruising and scabs. Feet are dry and peeling in spots due to Eczema per patient.    IVF:  NS @ 100 mL/hhr  ABX:  N/A    Nutrition: Regular diet w/ good appetite.  Activity: Assist x 1 w/ gait belt and walker  Safety: patient up in chair w/ wheels locked. Call light and personal items within reach and makes needs known.  in room visiting.  Face to face report given with opportunity to observe patient.    Report given to LINCOLN Hart RN   5/2/2023  7:14 PM        "

## 2023-05-02 NOTE — PLAN OF CARE
Free from falls/injuries this shift. Assess as charted.  Requires assist of 2, gait belt and walker to transfer short distance. Unsteady on feet.  CIWA : 2. IV: NS @ 100/hr. Med with tylenol, ibuprofen and Neurontin for discomfort and Trazadone to help with sleep. Large BM X2 this shift.     Face to face report given with opportunity to observe patient.    Report given to Bella Hernandez RN   5/1/2023  11:05 PM

## 2023-05-03 ENCOUNTER — APPOINTMENT (OUTPATIENT)
Dept: PHYSICAL THERAPY | Facility: HOSPITAL | Age: 48
End: 2023-05-03
Payer: COMMERCIAL

## 2023-05-03 ENCOUNTER — TELEPHONE (OUTPATIENT)
Dept: FAMILY MEDICINE | Facility: OTHER | Age: 48
End: 2023-05-03

## 2023-05-03 LAB
HOLD SPECIMEN: NORMAL
MAGNESIUM SERPL-MCNC: 2 MG/DL (ref 1.7–2.3)

## 2023-05-03 PROCEDURE — 99231 SBSQ HOSP IP/OBS SF/LOW 25: CPT | Performed by: INTERNAL MEDICINE

## 2023-05-03 PROCEDURE — G0378 HOSPITAL OBSERVATION PER HR: HCPCS

## 2023-05-03 PROCEDURE — 250N000013 HC RX MED GY IP 250 OP 250 PS 637: Performed by: STUDENT IN AN ORGANIZED HEALTH CARE EDUCATION/TRAINING PROGRAM

## 2023-05-03 PROCEDURE — 250N000013 HC RX MED GY IP 250 OP 250 PS 637: Performed by: FAMILY MEDICINE

## 2023-05-03 PROCEDURE — 97530 THERAPEUTIC ACTIVITIES: CPT | Mod: GP | Performed by: PHYSICAL THERAPIST

## 2023-05-03 PROCEDURE — 258N000003 HC RX IP 258 OP 636: Performed by: INTERNAL MEDICINE

## 2023-05-03 PROCEDURE — 83735 ASSAY OF MAGNESIUM: CPT | Performed by: INTERNAL MEDICINE

## 2023-05-03 PROCEDURE — 36415 COLL VENOUS BLD VENIPUNCTURE: CPT | Performed by: INTERNAL MEDICINE

## 2023-05-03 PROCEDURE — 250N000013 HC RX MED GY IP 250 OP 250 PS 637: Performed by: INTERNAL MEDICINE

## 2023-05-03 RX ORDER — LORAZEPAM 0.5 MG/1
0.5 TABLET ORAL EVERY 4 HOURS PRN
Status: DISCONTINUED | OUTPATIENT
Start: 2023-05-03 | End: 2023-05-04 | Stop reason: HOSPADM

## 2023-05-03 RX ADMIN — MULTIPLE VITAMINS W/ MINERALS TAB 1 TABLET: TAB at 09:53

## 2023-05-03 RX ADMIN — LORAZEPAM 0.5 MG: 0.5 TABLET ORAL at 21:31

## 2023-05-03 RX ADMIN — TRAZODONE HYDROCHLORIDE 50 MG: 50 TABLET ORAL at 21:31

## 2023-05-03 RX ADMIN — GABAPENTIN 100 MG: 100 CAPSULE ORAL at 14:58

## 2023-05-03 RX ADMIN — LORAZEPAM 1 MG: 1 TABLET ORAL at 14:58

## 2023-05-03 RX ADMIN — GABAPENTIN 100 MG: 100 CAPSULE ORAL at 08:24

## 2023-05-03 RX ADMIN — FAMOTIDINE 20 MG: 20 TABLET, FILM COATED ORAL at 09:53

## 2023-05-03 RX ADMIN — IBUPROFEN 400 MG: 200 TABLET, FILM COATED ORAL at 08:24

## 2023-05-03 RX ADMIN — GABAPENTIN 100 MG: 100 CAPSULE ORAL at 21:31

## 2023-05-03 RX ADMIN — LORAZEPAM 1 MG: 1 TABLET ORAL at 08:24

## 2023-05-03 RX ADMIN — POLYETHYLENE GLYCOL 3350 17 G: 17 POWDER, FOR SOLUTION ORAL at 09:53

## 2023-05-03 RX ADMIN — SERTRALINE HYDROCHLORIDE 50 MG: 50 TABLET ORAL at 09:53

## 2023-05-03 RX ADMIN — FOLIC ACID 1 MG: 1 TABLET ORAL at 09:53

## 2023-05-03 RX ADMIN — IBUPROFEN 400 MG: 200 TABLET, FILM COATED ORAL at 14:57

## 2023-05-03 RX ADMIN — FAMOTIDINE 20 MG: 20 TABLET, FILM COATED ORAL at 21:25

## 2023-05-03 RX ADMIN — IBUPROFEN 400 MG: 200 TABLET, FILM COATED ORAL at 21:31

## 2023-05-03 RX ADMIN — Medication 100 MG: at 09:53

## 2023-05-03 RX ADMIN — SODIUM CHLORIDE, PRESERVATIVE FREE: 5 INJECTION INTRAVENOUS at 03:36

## 2023-05-03 ASSESSMENT — ACTIVITIES OF DAILY LIVING (ADL)
ADLS_ACUITY_SCORE: 43
ADLS_ACUITY_SCORE: 43
ADLS_ACUITY_SCORE: 45
ADLS_ACUITY_SCORE: 43
ADLS_ACUITY_SCORE: 45
ADLS_ACUITY_SCORE: 45
ADLS_ACUITY_SCORE: 40
ADLS_ACUITY_SCORE: 43
ADLS_ACUITY_SCORE: 45
ADLS_ACUITY_SCORE: 40
ADLS_ACUITY_SCORE: 40
ADLS_ACUITY_SCORE: 43

## 2023-05-03 NOTE — PROGRESS NOTES
"Most Recent Vital Signs:  Temp: 98.2  F (36.8  C) Temp src: Tympanic BP: 163/74 Pulse: 77   Resp: 16 SpO2: 97 % O2 Device: None (Room air)      Pts VSS, CIWA scores 2, 8 , and 2. Assist of 1 w/ gait belt and walker. Pt. Still reports numbness and tingling but stated \"it's better than yesterday\".  Pt. Appears calm, cooperative, and pleasant. Continuous infusion was discontinued and I.V is SL/ order. Pt. Has reported no pain after ibuprofen was given by primary nurse at 0824.  Pt. Is up in chair, eating lunch. Call light within reach, chair alarm on, assistive devices with in reach.     Face to face report given to LINCOLN Franco.    Pooja Lacey  Student Nurse                        "

## 2023-05-03 NOTE — PROGRESS NOTES
05/03/23 1300   Appointment Info   Signing Clinician's Name / Credentials (OT) Fe Tracy OTR/L   Appointment Canceled Reason (OT) Patient declined;With other staff/provider   Appointment Cancel Comments (OT) Attempted OT 3 times today. She was with the nursing student the first two times. Pt declined the third time stating she had a busy morning and wanted to rest. Pt was encouraged to get up and move around with nursing later after she rested. Pt was hopeful to return home today. OT will attempt again when able.

## 2023-05-03 NOTE — PLAN OF CARE
"/82 (BP Location: Right arm, Patient Position: Supine, Cuff Size: Adult Regular)   Pulse 80   Temp 99.2  F (37.3  C) (Tympanic)   Resp 18   Ht 1.651 m (5' 5\")   Wt 65.5 kg (144 lb 6.4 oz)   LMP 02/28/2023 (Approximate)   SpO2 95%   BMI 24.03 kg/m      Pt oriented, pleasant, afebrile, VS and assessment as charted, A1 with gb and walker to the bathroom, free of falls or injury this shift. Little drowsy this AM.  NS running at 100 ml/hr. Every 4 hour CIWAS 3, 8, & 2. 1 mg ativan given after the score of 8 and recheck down to a 3. Pain 6/10 to butt and upper legs - given prn tylenol. IV came out while pt sleeping, new one placed in L hand by ICU RN. LS clear, HR regular, BS active. Alarms on, call light within reach.     Face to face report given with opportunity to observe patient.    Report given to Salvador KENDRICK RN and student nurse    Hailey Ott RN   5/3/2023  7:12 AM      "

## 2023-05-03 NOTE — TELEPHONE ENCOUNTER
10:47 AM    Reason for Call: OVERBOOK/ Hopsital follow up     Edith from Acute Care Case Management calling for the patient she is getting discharged from [facility] hospital today or on Thursday she needs a hospital follow up 7 to 10 days from discharge.     Patient has an [facility] care new patient appointment with Jocelyn Watt on 6/6/23 she needs to be seen sooner for the hospital follow up       The patient is requesting an appointment for next week with Jocelyn Watt.    Was an appointment offered for this call? No  If yes : Appointment type              Date    Preferred method for responding to this message: Telephone Call  What is your phone number ? Patient phone number [phone]    If we cannot reach you directly, may we leave a detailed response at the number you provided? Yes    Can this message wait until your PCP/provider returns, if unavailable today? NO     Lea Beckham

## 2023-05-03 NOTE — UTILIZATION REVIEW
"Admission Status; Secondary Review Determination     Admission Date: 4/30/2023  9:56 AM       Under the authority of the Utilization Management Committee, the utilization review process indicated a secondary review on the above patient.  The review outcome is based on review of the medical records, discussions with staff, and applying clinical experience noted on the date of the review.          (x) Observation Status Appropriate - This patient does not meet hospital inpatient criteria and is placed in observation status. If this patient's primary payer is Medicare and was admitted as an inpatient, Condition Code 44 should be used and patient status changed to \"observation\".       RATIONALE FOR DETERMINATION      Brief clinical presentation, information copied from the chart, abbreviated and edited for relevant content:       Freda Alanis is a 48 year old female who was admitted on 4/30/2023 for Generalized weakness/ataxia  Resulting in inability to walk.  Multifactorial, including dehydration, alcohol abuse, in addition to anxiety/depression.  Consideration of cerebellar ataxia, Wernicke's due to alcohol abuse, GBS.  MRI of brain negative for acute pathology. Improving with PT. Patient is borderline needing TCU  Plan for Supportive care and likely discharge tomorrow.             The severity of illness, intensity of cares provided, risk for adverse outcome, and expected LOS make the care appropriate for observation.       The information on this document is developed by the utilization review team in order for the business office to ensure compliance.  This only denotes the appropriateness of proper admission status and does not reflect the quality of care rendered.         The definitions of Inpatient Status and Observation Status used in making the determination above are those provided in the CMS Coverage Manual, Chapter 1 and Chapter 6, section 70.4.      Sincerely,     Lucita Bocanegra MD   Utilization " Review/ Case Management  [facility].

## 2023-05-03 NOTE — PLAN OF CARE
"Reason for hospital stay:  Abnormal Liver function tests/GBS   Living situation PTA: Home w/   Most recent vitals: /99 (BP Location: Left arm, Patient Position: Sitting, Cuff Size: Adult Regular)   Pulse 81   Temp 98.5  F (36.9  C) (Tympanic)   Resp 16   Ht 1.651 m (5' 5\")   Wt 65.5 kg (144 lb 6.4 oz)   LMP 02/28/2023 (Approximate)   SpO2 96%   BMI 24.03 kg/m      Pain Management:  rates pain at 5-6/10. Patient given Ibuprofen and gabapentin w/ relief.   LOC:  A&O x 4  Cardiac:  HRR regular this shift Bp's running 160's over 90's  Respiratory:  Lungs clear in the upper lobes and diminished in lower lobes equal bilat.  GI:  Pt chronically suffers from constipation-Had large BM upon admission to floor (4/30). Currently on Miralax and senna, ambulation promoted.  :  Voids spontaneously w/o difficulty-frequency noted  Skin Issues:  Some scattered bruising and scabs. Feet ar dry and peeling. Patient stated that she as Eczema.    IVF:  NS @ 100 mL/hr  ABX:  N/A    Nutrition: Regular diet w/ good appetite  Activity: Assist x 1 w/ gait belt and walker. Patient is weak in the BLE and currently working with PT. Improving since admission.  Safety:  Up in chair w/ wheels locked. Call light and personal items within reach and uses call light appropriately.  Face to face report given with opportunity to observe patient.    Report given to LINCOLN iFelds RN   5/3/2023  3:08 PM      "

## 2023-05-03 NOTE — DISCHARGE INSTRUCTIONS
[facility] will contact you for a hospital follow up appointment with Jocelyn Watt NP.  If you don't receive a call with an appointment by Monday, May 8th, please call [phone] to schedule an appointment.    Establish care appointment scheduled for Tuesday, June 6th at 2:45pm with Jocelyn Watt NP at [facility].  Please call [phone] if you need to reschedule.

## 2023-05-03 NOTE — PROGRESS NOTES
05/03/23 6643   Appointment Info   Signing Clinician's Name / Credentials (PT) Anabella Dietz DPMARCIE   Interventions   Interventions Quick Adds Therapeutic Activity   Therapeutic Activity   Therapeutic Activities: dynamic activities to improve functional performance Minutes (33726) 23   Symptoms Noted During/After Treatment Fatigue   Treatment Detail/Skilled Intervention Pt in bed upon PT arrival, agreeable to PT.  Pt transferred sup>sit SBA to EOB.  Pt needing to go to bathroom.  Pt transferred sit>stand min A from EOB, continues to demonstrates fairly significant ataxia.  Pt transferred from bed>commode with FWW min A.  Pt required assist to manage pants, was able to complete hygiene while seated unassisted.  Pt transferred sit>stand min A from commode up to FWW.  Pt ambulated 150' with FWW min A, continues to demonstrate ataxia with her gait but no major LOB  today.  Had long discussion about discharging.  Pt reports she has friends lined up to be with her once she wakes up while her  is at work.  Discussed that she needs to have them physically by her side when she's mobilizing until her stability and safety improves.  Pt verbalizes understanding.  Pt states she plans to get a commode to have by her bed if she does need to get up when no one is there.  Pt wanting to return home.   DIscussed benefit of home PT to continue to work on safety and stability within her home.  Pt also will need a FWW for discharge.  Pt left sitting upin chair with clip alarm on and call light in reach.   PT Discharge Planning   PT Plan Progress functional mobility and strength   PT Discharge Recommendation (DC Rec) Transitional Care Facility;home with assist;home with home care physical therapy   PT Rationale for DC Rec Pt continues to demonstrate ataxia with gait putting her at risk for falls however she wants to go home and has help lined up so she won't be home alone during her awake hours.  While she would benefit from daily  rehab at short term rehab, home with 24 hour assist and home PT services would be next best option.   PT Brief overview of current status Pt in bed upon PT arrival, agreeable to PT.  Pt transferred sup>sit SBA to EOB.  Pt needing to go to bathroom.  Pt transferred sit>stand min A from EOB, continues to demonstrates fairly significant ataxia.  Pt transferred from bed>commode with FWW min A.  Pt required assist to manage pants, was able to complete hygiene while seated unassisted.  Pt transferred sit>stand min A from commode up to FWW.  Pt ambulated 150' with FWW min A, continues to demonstrate ataxia with her gait but no major LOB  today.  Had long discussion about discharging.  Pt reports she has friends lined up to be with her once she wakes up while her  is at work.  Discussed that she needs to have them physically by her side when she's mobilizing until her stability and safety improves.  Pt verbalizes understanding.  Pt states she plans to get a commode to have by her bed if she does need to get up when no one is there.  Pt wanting to return home.   DIscussed benefit of home PT to continue to work on safety and stability within her home.  Pt also will need a FWW for discharge.  Pt left sitting upin chair with clip alarm on and call light in reach.   Total Session Time   Timed Code Treatment Minutes 23   Total Session Time (sum of timed and untimed services) 23

## 2023-05-03 NOTE — PROGRESS NOTES
[facility]    Hospitalist Progress Note    Date of Service (when I saw the patient): 05/03/2023    Assessment & Plan   Freda Alanis is a 48 year old female who was admitted on 4/30/2023.    Generalized weakness/ataxia:  Resulting in inability to walk.  Multifactorial, including dehydration, alcohol abuse, in addition to anxiety/depression.  Consideration of cerebellar ataxia, Wernicke's due to alcohol abuse, GBS.  MRI of brain negative for acute pathology.  -Improving with PT  -Patient is borderline needing TCU  -Supportive care  -MVI replacement     Anxiety/depression: Patient does not take any psychiatric medications as an outpatient.  She states that she has not seen behavioral health specialist for many years.  -Appreciate psych consult, Zoloft started     Transaminitis: Likely due to alcohol abuse.  CT abdomen pelvis reveals steatosis.  -Trending down     Alcohol abuse: Last drink patient states was 2 days ago.  Patient denies history of any withdrawal symptoms.  -Multivitamin replacement  -Monitor for signs of withdrawal     Tobacco abuse: Nicotine replacement as needed     FEN: Slight hypercalcemia resolved, likely due to dehydration.  Slight hypomagnesemia, replaced  -Oral diet as tolerated       Clinically Significant Risk Factors Present on Admission              # Hypoalbuminemia: Lowest albumin = 3.1 g/dL at 5/1/2023  5:10 AM, will monitor as appropriate                    DVT Prophylaxis: Low Risk/Ambulatory with no VTE prophylaxis indicated    Code Status: Full Code    Disposition: Expected discharge home versus nursing home depending on functionality.    Raul Morales MD, MD        Interval History   Patient seen in room.  Alert, appropriately interactive, pleasant.  No distress.  No acute events overnight, no new new symptoms.    -Data reviewed today: I reviewed all new labs and imaging results over the last 24 hours. I personally reviewed imaging reports.    Physical Exam   Temp:  99.2  F (37.3  C) Temp src: Tympanic BP: 143/82 Pulse: 80   Resp: 18 SpO2: 95 % O2 Device: None (Room air)    Vitals:    04/30/23 1539   Weight: 65.5 kg (144 lb 6.4 oz)     Vital Signs with Ranges  Temp:  [97.9  F (36.6  C)-99.2  F (37.3  C)] 99.2  F (37.3  C)  Pulse:  [76-92] 80  Resp:  [18-20] 18  BP: (143-163)/() 143/82  SpO2:  [94 %-98 %] 95 %      Intake/Output Summary (Last 24 hours) at 5/3/2023 0801  Last data filed at 5/3/2023 0655  Gross per 24 hour   Intake 1815 ml   Output 1850 ml   Net -35 ml         Peripheral IV 05/02/23 Left Hand (Active)   Site Assessment WDL 05/03/23 0340   Line Status Infusing 05/03/23 0340   Dressing Transparent 05/03/23 0340   Dressing Status clean;dry;intact 05/03/23 0340   Phlebitis Scale 0-->no symptoms 05/03/23 0340   Infiltration? no 05/03/23 0340   Number of days: 1     No line/device    Constitutional: AA, no distress today  Eyes: PERRLA, no injection, no icterus  HEENT: atraumatic, normocephalic  Respiratory: CTA b/l  Cardiovascular: S1 S2 RRR, somewhat tachycardic  GI: soft, NT, ND, + bowel sounds  Lymph/Hematologic: no palpable lymphadenopathy  Skin: no rashes, no lesions  Musculoskeletal: trace LE edema, good tone, no deformities  Neurologic: oriented x 3, no focal deficits  Psychiatric: appropriate affect      Medications     sodium chloride 100 mL/hr at 05/03/23 0336       famotidine  20 mg Oral BID     folic acid  1 mg Oral Daily     multivitamin w/minerals  1 tablet Oral Daily     polyethylene glycol  17 g Oral Daily     sertraline  50 mg Oral Daily     sodium chloride (PF)  3 mL Intracatheter Q8H     sodium chloride (PF)  5 mL Intravenous Q8H     thiamine  100 mg Oral Daily       Data   Recent Labs   Lab 05/01/23  0510 04/30/23  1037   WBC 7.4 11.3*   HGB 11.5* 14.9   * 99    353    139   POTASSIUM 3.6 4.0   CHLORIDE 104 97*   CO2 25 27   BUN 4.0* 5.7*   CR 0.47* 0.45*   ANIONGAP 8 15   CESARIO 8.0* 10.6*   * 113*   ALBUMIN 3.1* 4.4    PROTTOTAL 5.5* 7.5   BILITOTAL 0.7 0.5   ALKPHOS 150* 241*   ALT 91* 158*   AST 65* 128*   LIPASE  --  21     Lactic Acid   Date Value Ref Range Status   04/30/2023 1.5 0.7 - 2.0 mmol/L Final   04/30/2023 2.1 (H) 0.7 - 2.0 mmol/L Final       No results found for this or any previous visit (from the past 24 hour(s)).    Raul Morales MD

## 2023-05-03 NOTE — PROGRESS NOTES
Contacted medical supply in regards to coverage for commode and shower chair.  Per conversation, commode covered, shower chair would not be.  Updated Freda.  Establish care appointment scheduled and request for hospital follow up requested with Jocelyn Watt.

## 2023-05-04 VITALS
SYSTOLIC BLOOD PRESSURE: 157 MMHG | HEIGHT: 65 IN | BODY MASS INDEX: 24.06 KG/M2 | RESPIRATION RATE: 20 BRPM | OXYGEN SATURATION: 97 % | WEIGHT: 144.4 LBS | DIASTOLIC BLOOD PRESSURE: 96 MMHG | TEMPERATURE: 98.8 F | HEART RATE: 68 BPM

## 2023-05-04 PROCEDURE — 250N000013 HC RX MED GY IP 250 OP 250 PS 637: Performed by: INTERNAL MEDICINE

## 2023-05-04 PROCEDURE — 250N000013 HC RX MED GY IP 250 OP 250 PS 637: Performed by: FAMILY MEDICINE

## 2023-05-04 PROCEDURE — 99239 HOSP IP/OBS DSCHRG MGMT >30: CPT | Performed by: INTERNAL MEDICINE

## 2023-05-04 PROCEDURE — G0378 HOSPITAL OBSERVATION PER HR: HCPCS

## 2023-05-04 PROCEDURE — 250N000013 HC RX MED GY IP 250 OP 250 PS 637: Performed by: STUDENT IN AN ORGANIZED HEALTH CARE EDUCATION/TRAINING PROGRAM

## 2023-05-04 RX ORDER — GABAPENTIN 100 MG/1
100 CAPSULE ORAL 3 TIMES DAILY PRN
Qty: 30 CAPSULE | Refills: 0 | Status: SHIPPED | OUTPATIENT
Start: 2023-05-04 | End: 2023-05-15

## 2023-05-04 RX ORDER — TRAZODONE HYDROCHLORIDE 50 MG/1
25 TABLET, FILM COATED ORAL
Qty: 20 TABLET | Refills: 0 | Status: SHIPPED | OUTPATIENT
Start: 2023-05-04 | End: 2023-05-10

## 2023-05-04 RX ORDER — LORAZEPAM 0.5 MG/1
0.5 TABLET ORAL EVERY 4 HOURS PRN
Qty: 12 TABLET | Refills: 0 | Status: SHIPPED | OUTPATIENT
Start: 2023-05-04 | End: 2023-05-10

## 2023-05-04 RX ADMIN — LORAZEPAM 0.5 MG: 0.5 TABLET ORAL at 09:23

## 2023-05-04 RX ADMIN — GABAPENTIN 100 MG: 100 CAPSULE ORAL at 09:24

## 2023-05-04 RX ADMIN — Medication 100 MG: at 09:23

## 2023-05-04 RX ADMIN — SERTRALINE HYDROCHLORIDE 50 MG: 50 TABLET ORAL at 09:23

## 2023-05-04 RX ADMIN — FAMOTIDINE 20 MG: 20 TABLET, FILM COATED ORAL at 09:23

## 2023-05-04 RX ADMIN — IBUPROFEN 400 MG: 200 TABLET, FILM COATED ORAL at 09:24

## 2023-05-04 RX ADMIN — FOLIC ACID 1 MG: 1 TABLET ORAL at 09:24

## 2023-05-04 RX ADMIN — MULTIPLE VITAMINS W/ MINERALS TAB 1 TABLET: TAB at 09:23

## 2023-05-04 ASSESSMENT — ACTIVITIES OF DAILY LIVING (ADL)
ADLS_ACUITY_SCORE: 44
ADLS_ACUITY_SCORE: 43
ADLS_ACUITY_SCORE: 43
ADLS_ACUITY_SCORE: 44
ADLS_ACUITY_SCORE: 44
ADLS_ACUITY_SCORE: 43

## 2023-05-04 NOTE — PROGRESS NOTES
Contacted [facility].  They have received the order for the commode. If desired they could deliver it.  The cost for a shower chair is $40-$60.  Freda updated about this, appointments and that she will not be able to receive home care services at this time.  Freda verbalized understanding.  She plans to contact her , Lei for transportation home.    Name: Freda Alanis    MRN#: [id number]    Reason for Hospitalization: Dehydration [E86.0];Hypomagnesemia [E83.42];Generalized muscle weakness [M62.81];Abnormal liver function test [R79.89]    TERE: low    Discharge Date: 5/4/2023    Patient / Family response to discharge plan: agreeable     Follow-Up Appt:   Future Appointments   Date Time Provider Department Center   6/6/2023  3:00 PM Jocelyn Watt NP [facility]       Other Providers (Care Coordinator, County Services, PCA services etc): No    Discharge Disposition: home    JOY Higgins

## 2023-05-04 NOTE — PLAN OF CARE
"  Most recent vitals: /83 (BP Location: Right arm, Patient Position: Semi-Lawrence's, Cuff Size: Adult Regular)   Pulse 81   Temp 98.7  F (37.1  C) (Tympanic)   Resp 18   Ht 1.651 m (5' 5\")   Wt 65.5 kg (144 lb 6.4 oz)   LMP 02/28/2023 (Approximate)   SpO2 96%   BMI 24.03 kg/m      Pain Management: Denies  LOC: A/O, calm, cooperative  Cardiac: WDL  Respiratory: Clear & equal bilateraly  GI: 2 small stools (soft,formed, brown)  : 1675 U/O  Skin Issues:  Peeling/scaling right & left shin, Scattered bruising & scabs    IV: Loss of IV access    Nutrition: Reg diet  Ambulation: shaky/unsteady gait, gait belt & walker   Safety: WDL    Comments:   YVONNE-Ar 2,    Face to face report given with opportunity to observe patient.    Report given to Kayleen BURR RN    5/4/2023  7:30AM  Bella Love RN      "

## 2023-05-04 NOTE — TELEPHONE ENCOUNTER
Lvm for pt advising of appt date and time; also advised if this does not work to please call scheduling

## 2023-05-04 NOTE — PLAN OF CARE
Free from falls/injuries this shift. Assess as charted. Uses call light approp when needs to get up to bathroom. Uses gait belt and walker for ambulation. Gait pattern becoming more steady since 2 days ago.  Appetite fair for dinner.  IV site with redness- SL removed. @ HS, req and med with Ibuprofen, trazadone, ativan , neurontin to aid in sleep. Feels she has improved since admit and is happy about going home tomorrow. CIWA: 2&3.     Face to face report given with opportunity to observe patient.    Report given to Bella Hernandez RN   5/3/2023  10:33 PM

## 2023-05-04 NOTE — PLAN OF CARE
Patient discharged around 12:00 PM via wheel chair accompanied by spouse and staff. Prescriptions sent to patients preferred pharmacy. All belongings sent with patient.     Discharge instructions reviewed with pt. Listed belongings gathered and returned to patient. All personal    Patient discharged to home.   Report called to na    Surgical Patient   Surgical Procedures during stay: none  Did patient receive discharge instruction on wound care and recognition of infection symptoms? Yes    MISC  Follow up appointment made:  Yes. Establish care. Waiting for follow up. They will call pt.  Home medications returned to patient: N/A  Patient reports pain was well managed at discharge: Yes.    Pt states moving better. With unknown origin gave the guillian barre discharge instructions as guidelines. Pt knows to  commode and meds. Walker sent with pt. Pt has help set up to help at home for a few days.

## 2023-05-04 NOTE — PLAN OF CARE
Physical Therapy Discharge Summary    Reason for therapy discharge:    Discharged to home.    Progress towards therapy goal(s). See goals on Care Plan in [facility] electronic health record for goal details.  Goals partially met.  Barriers to achieving goals:   discharge from facility.    Therapy recommendation(s):    Continued therapy is recommended.  Rationale/Recommendations:  home PT however needs to establish care with provider before she can be enrolled..    Goal Outcome Evaluation:

## 2023-05-04 NOTE — DISCHARGE SUMMARY
[facility]    Discharge Summary  Hospitalist    Date of Admission:  4/30/2023  Date of Discharge:  5/4/2023  Discharging Provider: Raul Morales MD, MD  Date of Service (when I saw the patient): 05/04/23    Discharge Diagnoses   Principal Problem:    Abnormal liver function test  Active Problems:    Dehydration    Hypomagnesemia    Generalized muscle weakness  Ataxia  Anxiety/depression  Transaminitis  Alcohol abuse  Tobacco abuse    History of Present Illness   Freda Alanis is an 48 year old female who presented with weakness.  Please see admission H+P for additional details.    Hospital Course   Freda Alanis was admitted on 4/30/2023.  48-year-old female with history of anxiety and depression, as well as known alcohol abuse presented with weakness and inability to walk.  CT and MRI of brain were negative.  Patient was exhibiting some signs of withdrawal, so she was placed on CIWA protocol as well as multivitamin replacement including thiamine and folate.  She also did have a mild transaminitis, likely signifying recent alcohol use.  She states last drink was approximately 2 days ago.  Upon PT evaluation, patient was found to be more ataxic.  Consideration given to cerebellar ataxia, Wernicke's, however with preceding gastrointestinal illness approximately 2 weeks ago, possibility of GBS exists as well.  Since she did improve daily and quickly during her hospitalization, further investigation including lumbar puncture were deferred.  She is able to ambulate safely with a walker at this time, and desires to go home.  We will have her follow-up with [facility] clinic for establishing primary care within the next week or so.  At that time she can be assessed for continued improvement.  We did have behavioral health consult during her hospitalization for depression, they started Zoloft.  She would likely need to follow-up with behavioral health as an outpatient as well.  She was not interested in  alcohol cessation programs.    Raul Morales MD      Significant Results and Procedures   See below    Pending Results   These results will be followed up by No Ref-Primary, Physician    Unresulted Labs Ordered in the Past 30 Days of this Admission     No orders found from 3/31/2023 to 5/1/2023.          Code Status   Full Code       Primary Care Physician   Physician No Ref-Primary    Physical Exam   Temp: 98.7  F (37.1  C) Temp src: Tympanic BP: 146/83 Pulse: 81   Resp: 18 SpO2: 96 % O2 Device: None (Room air)    Vitals:    04/30/23 1539   Weight: 65.5 kg (144 lb 6.4 oz)     Vital Signs with Ranges  Temp:  [98.2  F (36.8  C)-99.3  F (37.4  C)] 98.7  F (37.1  C)  Pulse:  [77-81] 81  Resp:  [16-18] 18  BP: (137-163)/(74-85) 146/83  SpO2:  [96 %-97 %] 96 %  I/O last 3 completed shifts:  In: 1260 [P.O.:1260]  Out: 1975 [Urine:1975]    Constitutional: AA, NAD  Eyes: PERRLA, no injection, no icterus  HEENT: atraumatic, normocephalic  Respiratory: CTA b/l  Cardiovascular: S1 S2 RRR  GI: soft, NT, ND, + bowel sounds  Lymph/Hematologic: no palpable lymphadenopathy  Skin: no rashes, no lesions  Musculoskeletal: No edema, good tone, no deformities  Neurologic: oriented x 3, 5 out of 5 strength lower extremities  Psychiatric: appropriate affect      Discharge Disposition   Discharged to home  Condition at discharge: Stable    Consultations This Hospital Stay   PSYCHIATRY IP CONSULT  CARE MANAGEMENT / SOCIAL WORK IP CONSULT  PHYSICAL THERAPY ADULT IP CONSULT  OCCUPATIONAL THERAPY ADULT IP CONSULT    Time Spent on this Encounter   I, Raul Morales MD, personally saw the patient today and spent greater than 30 minutes discharging this patient.    Discharge Orders      Adult Mental Health  Referral      Reason for your hospital stay    Weakness     Follow-up and recommended labs and tests     Follow up with primary care provider, Physician No Ref-Primary, within 7 days for hospital follow- up.  No follow up labs or  test are needed.     Activity    Your activity upon discharge: activity as tolerated     Commode    DME Documentation:   Describe the reason for need to support medical necessity: GBSMaya CASTRO, the undersigned, certify that the above prescribed supplies are medically necessary for this patient and is both reasonable and necessary in reference to accepted standards of medical and necessary in reference to accepted standards of medical practice in the treatment of this patient's condition and is not prescribed as a convenience.     Stevie CASTRO, the undersigned, certify that the above prescribed supplies are medically necessary for this patient and is both reasonable and necessary in reference to accepted standards of medical and necessary in reference to accepted standards of medical practice in the treatment of this patient's condition and is not prescribed as a convenience.      Diet    Follow this diet upon discharge: Orders Placed This Encounter      Regular Diet Adult     Discharge Medications   Current Discharge Medication List      START taking these medications    Details   gabapentin (NEURONTIN) 100 MG capsule Take 1 capsule (100 mg) by mouth 3 times daily as needed for neuropathic pain or other (pain)  Qty: 30 capsule, Refills: 0    Associated Diagnoses: Anxiety state      LORazepam (ATIVAN) 0.5 MG tablet Take 1 tablet (0.5 mg) by mouth every 4 hours as needed for anxiety  Qty: 12 tablet, Refills: 0    Associated Diagnoses: Anxiety state      sertraline (ZOLOFT) 50 MG tablet Take 1 tablet (50 mg) by mouth daily  Qty: 30 tablet, Refills: 1    Associated Diagnoses: Anxiety state      traZODone (DESYREL) 50 MG tablet Take 0.5 tablets (25 mg) by mouth nightly as needed for sleep  Qty: 20 tablet, Refills: 0    Associated Diagnoses: Anxiety state         CONTINUE these medications which have NOT CHANGED    Details   diphenhydrAMINE (BENADRYL) 25 MG capsule Take 25-50 mg by mouth nightly as needed for allergies or  sleep      ibuprofen (ADVIL) 200 MG tablet Take 200-400 mg by mouth daily as needed      loratadine (CLARITIN) 10 MG tablet Take 10 mg by mouth daily as needed for allergies      multivitamin, therapeutic (THERA-VIT) TABS tablet Take 1 tablet by mouth daily           Allergies   Allergies   Allergen Reactions     Codeine Hives     Gluten Meal Diarrhea     Lactose Diarrhea     Sulfa Antibiotics      Data   Most Recent 3 CBC's:  Recent Labs   Lab Test 05/01/23  0510 04/30/23  1037 09/08/20  0943   WBC 7.4 11.3* 10.0   HGB 11.5* 14.9 15.2   * 99 106*    353 332      Most Recent 3 BMP's:  Recent Labs   Lab Test 05/01/23  0510 04/30/23  1037 09/08/20  0943    139 136   POTASSIUM 3.6 4.0 3.9   CHLORIDE 104 97* 102   CO2 25 27 27   BUN 4.0* 5.7* 4*   CR 0.47* 0.45* 0.54   ANIONGAP 8 15 7   CESARIO 8.0* 10.6* 9.5   * 113* 125*     Most Recent 2 LFT's:  Recent Labs   Lab Test 05/01/23  0510 04/30/23  1037   AST 65* 128*   ALT 91* 158*   ALKPHOS 150* 241*   BILITOTAL 0.7 0.5     Most Recent INR's and Anticoagulation Dosing History:  Anticoagulation Dose History         Latest Ref Rng & Units 7/19/2011   Recent Dosing and Labs   INR 0.86 - 1.14 0.92                Most Recent 3 Troponin's:No lab results found.  Most Recent Cholesterol Panel:No lab results found.  Most Recent 6 Bacteria Isolates From Any Culture (See EPIC Reports for Culture Details):No lab results found.  Most Recent TSH, T4 and A1c Labs:  Recent Labs   Lab Test 04/30/23  1037 09/08/20  0943   TSH 1.42 2.72   A1C  --  6.1*     Results for orders placed or performed during the hospital encounter of 04/30/23   CT Abdomen Pelvis w Contrast    Narrative    EXAM: CT ABDOMEN PELVIS W CONTRAST 4/30/2023 11:25 AM    HISTORY: Generalized weakness, palpable mass versus liver edge in  right upper quadrant, longstanding history of alcohol abuse.    COMPARISON: No relevant prior comparison available for review.    TECHNIQUE:   Imaging protocol:  Computed tomography of the abdomen and pelvis with  imaging acquired after administration of intravenous contrast. Meds  given: 80 mL Isovue-370.  Acquisition: This CT exam was performed using one or more the  following dose reduction techniques: automated exposure control,  adjustment of the mA and/or kV according to patient size, and/or  iterative reconstruction technique.    FINDINGS:    LOWER CHEST:  Bibasilar atelectasis. No suspicious pulmonary nodules or masses.    ABDOMEN/PELVIS:  LIVER: Heterogeneous patchy hypoattenuation of the hepatic parenchyma  with hepatomegaly. Liver measures at least 23 cm in maximal  craniocaudal dimension.  GALLBLADDER: Cholelithiasis without cholecystitis.  BILE DUCTS: No biliary tract dilatation.  PANCREAS: Within normal limits.  SPLEEN: Within normal limits.  ADRENALS: Within normal limits.    KIDNEYS/URETERS: Within normal limits.  URINARY BLADDER: Within normal limits.  REPRODUCTIVE ORGANS: No pelvic masses.    BOWEL: No bowel dilatation or wall thickening. Normal appendix.  Moderate to large volume of stool in the colon.  PERITONEUM/RETROPERITONEUM: No free air. Small physiologic volume of  free fluid in the pelvis.  VESSELS: Within normal limits. Main portal vein is not dilated,  measures up to 1.1 cm.  LYMPH NODES: There are no pathologically enlarged lymph nodes.    BONES AND SOFT TISSUES:  No suspicious osseous lesions. Degenerative periarticular changes and  spinal spondylosis.      Impression    IMPRESSION:  1.  Marked hepatomegaly with heterogeneous enhancement, consistent  with steatohepatitis.  2.  Cholelithiasis without cholecystitis.    TRAVIS OQUENDO MD         SYSTEM ID:  [id number]   CT Head w Contrast    Narrative    EXAM: CT HEAD W/ CONTRAST 4/30/2023 12:35 PM    PROVIDED HISTORY: Weakness, inability to ambulate    COMPARISON: Head 7/19/2011    TECHNIQUE:   Imaging protocol: Multiplanar CT images of the head with intravenous  contrast. 80 mL Isovue 370  administered intravenously.  Acquisition: This CT exam was performed using one or more the  following dose reduction techniques: automated exposure control,  adjustment of the mA and/or kV according to patient size, and/or  iterative reconstruction technique.    FINDINGS:  No intracranial hemorrhage, mass effect, or midline shift. The  ventricles are proportionate to the cerebral sulci. No acute loss of  gray-white matter differentiation.     Vasculature appears hyperdense, consistent with recent administration  of intravenous contrast. Postcontrast images demonstrate no areas of  abnormal intraaxial or extraaxial contrast enhancement.     No acute osseous abnormality. Metallic streak artifact from left  earring noted, partially limiting evaluation of the adjacent tissues.  No paranasal sinus mucosal thickening. Mastoid air cells are clear.  The orbits are grossly unremarkable.       Impression    IMPRESSION:  No acute intracranial abnormality.    TRAVIS OQUENDO MD         SYSTEM ID:  [id number]   MR Brain w/o & w Contrast    Narrative    EXAM:  MR BRAIN W/O & W CONTRAST    HISTORY:  ataxia.    TECHNIQUE:  Multiplanar, multisequence MR imaging of the head obtained  prior to, and after, intravenous contrast administration    MEDS/CONTRAST: Gadavist   6.5  mL    COMPARISON:  CT head 4/30/2023, 7/19/2011     FINDINGS:    There is no mass effect, midline shift or evidence of acute  intracranial hemorrhage. The ventricles are proportionate to the  cerebral sulci. Normal major vascular intracranial flow voids.    Postcontrast images demonstrate no abnormal intracranial enhancement.    No suspicious abnormality of the skull marrow signal. No paranasal  sinus mucosal thickening. Mastoid air cells are clear. The orbits are  grossly unremarkable.      Impression    IMPRESSION:  Negative exam. No acute intracranial abnormality. No abnormal  intracranial enhancement.    TRAVIS OQUENDO MD         SYSTEM ID:  [id number]

## 2023-05-04 NOTE — PLAN OF CARE
Occupational Therapy Discharge Summary    Reason for therapy discharge:    Discharged to home with assist.    Progress towards therapy goal(s). See goals on Care Plan in [facility] electronic health record for goal details.  Goals partially met.  Barriers to achieving goals:   discharge from facility.    Therapy recommendation(s):    SNF was recommended but pt chose to return home with assistance. She would benefit from ongoing skilled OT services to continue progressing her strength and independence in ADLs if she is not back to her baseline upon returning home.

## 2023-05-08 NOTE — PROGRESS NOTES
Assessment & Plan     Heavy alcohol use  Patient continues to drink 2-4 shots nightly. Cessation encouraged. Declined treatment.     Hypomagnesemia  - Magnesium-normal today.     Episode of recurrent major depressive disorder, unspecified depression episode severity (H)  LUIS (generalized anxiety disorder)  Much improved with sertraline and gabapentin. Will continue for now.     Elevated LFTs  Still mildly elevated. Most likely from her alcohol use. GI referral placed. Alcohol cessation encouraged.     - Comprehensive metabolic panel (BMP + Alb, Alk Phos, ALT, AST, Total. Bili, TP)    Anemia, unspecified type  Hgb normal today.     - CBC with platelets and differential  - Iron and iron binding capacity  - Ferritin  - Folate  - Vitamin B12    Steatohepatitis  - Adult GI  Referral - Consult Only  -Alcohol cessation encouraged.     Lipid screening  Hyperlipidemia, unspecified hyperlipidemia type  - Lipid Profile (Chol, Trig, HDL, LDL calc)-high today. Will make follow-up to discuss treatment.     Hospital discharge follow-up  Generalized muscle weakness  Numbness and tingling  Patient presents today with generalized weakness and numbness. Unable to walk without walker. When using walker, often veering to the right. Crashes into wall. Shuffles. Finger to nose tap abnormal. Unable to lift purse off chair next to her. Trouble word finding. Recent brain MRI and CT normal. No real concerns in labs thus far. CBC and CMP unremarkable. CRP and ESR normal. Magnesium normal. Vit b12 pending.     I did call Dr. Mcfadden [facility]. Recommended transfer to nearest ER and then transfer to facility with neurology. Patient refused. Stated that she felt ok. No headaches. I told her that she could die. She still refused. I also told her significant other that they needed to go to the ER. Would think about it. He needed to get to work. She is able to make her own decisions. Of sound mind. Made aware of the risks of  not going to the ER, including death    Most likely needs LP and spinal imaging.     - Adult Neurology  Referral        Insomnia, unspecified type  Much better controlled. Was taking 100 mg in the past. Medication increased. Will reassess in 4 weeks.     - traZODone (DESYREL) 100 MG tablet; Take 1 tablet (100 mg) by mouth At Bedtime    Encounter for screening mammogram for breast cancer  - MA Screen Bilateral w/Yoel; Future-mammogram    Special screening for malignant neoplasms, colon  - Colonoscopy Screening  Referral; Future-colonoscopy    Nausea  Unsure cause of recent stomach flu. Will rule out any hepatitis. Will notify patient of the results when available and intervene accordingly. Is feeling better now. No nausea or vomiting currently.     - Hepatitis C Screen Reflex to HCV RNA Quant and Genotype  - Hepatitis B Surface Antibody  - Hepatitis B surface antigen  - Hepatitis A antibody IgM      Abnormal serum iron level  Most likely due to her hepatitis. GI referral placed.           Independent interpretation of a test performed by another physician/other qualified health care professional (not separately reported) - unable to drive  Discussion of management or test interpretation with external physician/other qualified healthcare professional/[facility]  Diagnosis or treatment significantly limited by social determinants of health - unable to drive-dependant on   Ordering of each unique test  Prescription drug management       MED REC REQUIRED  Post Medication Reconciliation Status:  Discharge medications reconciled, continue medications without change    Nicotine/Tobacco Cessation:  She reports that she has been smoking cigarettes. She started smoking about 28 years ago. She has never used smokeless tobacco.  Nicotine/Tobacco Cessation Plan:   Information offered: Patient not interested at this time          Return in about 4 weeks (around  "6/7/2023).    Jocelyn Watt NP  [facility] [place]    Leticia Barba is a 48 year old, presenting for the following health issues:  Hospital F/U      [facility]       Hospital Follow-up Visit:    Hospital/Nursing Home/IP Rehab Facility: [facility]  Date of Admission: 4/30/23  Date of Discharge: 5/4/23  Reason(s) for Admission: anxiety, low magnesium, dehydration, abnormal LFTs    Was your hospitalization related to COVID-19? No   Problems taking medications regularly:  None  Medication changes since discharge: None  Problems adhering to non-medication therapy:  None    Summary of hospitalization: Per discharge note,    \"Freda Alanis was admitted on 4/30/2023.  48-year-old female with history of anxiety and depression, as well as known alcohol abuse presented with weakness and inability to walk.  CT and MRI of brain were negative.  Patient was exhibiting some signs of withdrawal, so she was placed on CIWA protocol as well as multivitamin replacement including thiamine and folate.  She also did have a mild transaminitis, likely signifying recent alcohol use.  She states last drink was approximately 2 days ago.  Upon PT evaluation, patient was found to be more ataxic.  Consideration given to cerebellar ataxia, Wernicke's, however with preceding gastrointestinal illness approximately 2 weeks ago, possibility of GBS exists as well.  Since she did improve daily and quickly during her hospitalization, further investigation including lumbar puncture were deferred.  She is able to ambulate safely with a walker at this time, and desires to go home.  We will have her follow-up with [facility] for establishing primary care within the next week or so.  At that time she can be assessed for continued improvement.  We did have behavioral health consult during her hospitalization for depression, they started Zoloft.  She would likely need to follow-up with behavioral health as an outpatient as " well.  She was not interested in alcohol cessation programs.     Raul Morales MD    She was also mildly anemic while in the hospital.     Diagnostic Tests/Treatments reviewed.  Follow up needed: CBC, CMP, magnesium  Other Healthcare Providers Involved in Patient s Care:         psychiatry  Update since discharge: about the same    Today she notes that she is doing about the same. Unable to walk without a walker. Unable to get her shoes on. Unable to lift anything. Too weak. Also has numbness and tingling over her entire body.     Feels these symptoms started about 2 weeks after a horrible stomach flu. Then develops low back pain about one week later. Still has a dull ache in her low back. Pain does not radiate.     No fevers. No trouble breathing. No chest pain.      Mental health has improved with the sertraline. No thoughts of self harm. Taking trazodone 50 mg before before bed. Feels this helps. Would like to increase it slightly. Has continued to drink 2-4 shots of gin or vodka daily. Gabapentin was also started in the hospital to help with anxiety. Feels this is working well. Would like to continue.     Recent abdominal CT with hepatic steatosis. She has never seen GI. Willing. No nausea or vomiting. No abdominal pain.     Plan of care communicated with patient    No recent vaccines.     Due for a mammogram and colonoscopy. Willing to complete both.      Answers for HPI/ROS submitted by the patient on 5/10/2023  If you checked off any problems, how difficult have these problems made it for you to do your work, take care of things at home, or get along with other people?: Somewhat difficult  PHQ9 TOTAL SCORE: 3        Review of Systems   Constitutional, HEENT, cardiovascular, pulmonary, gi and gu systems are negative, except as otherwise noted.      Objective    BP (!) 140/90 (BP Location: Right arm, Patient Position: Chair, Cuff Size: Adult Regular)   Pulse 91   Temp 97.9  F (36.6  C) (Tympanic)   Resp 17    Wt 62.9 kg (138 lb 9 oz)   LMP 02/28/2023 (Approximate)   SpO2 98%   BMI 23.06 kg/m    Body mass index is 23.06 kg/m .  Physical Exam   GENERAL: alert, no distress and unable to walk without walker  EYES: Eyes grossly normal to inspection, PERRL and conjunctivae and sclerae normal  HENT: ear canals and TM's normal, nose and mouth without ulcers or lesions  NECK: no adenopathy, no asymmetry, masses, or scars and thyroid normal to palpation  RESP: lungs clear to auscultation - no rales, rhonchi or wheezes  CV: regular rate and rhythm, no murmur, click or rub, no peripheral edema and peripheral pulses strong  ABDOMEN: soft, nontender, liver feels enlarged, no masses and bowel sounds normal  SKIN: no suspicious lesions or rashes  NEURO: Strength UEs and LEs 4/5, sensory exam grossly normal, mentation intact, but occasionally has trouble word fidning, oriented times 3, speech normal, cranial nerves 2-12 intact, DTR's abnormal: unable to test patellar reflexes-unable to move onto exam table, gait abnormal: uses walker, unable to walk without this, shuffled gait, when walking down the lacy, often crashes into the wall on her right, Romberg abnormal-unable to stand alone and rapid alternating movements abnormal  PSYCH: mentation appears normal, affect normal/bright    Results for orders placed or performed in visit on 05/10/23 (from the past 24 hour(s))   Comprehensive metabolic panel (BMP + Alb, Alk Phos, ALT, AST, Total. Bili, TP)   Result Value Ref Range    Sodium 137 136 - 145 mmol/L    Potassium 4.0 3.4 - 5.3 mmol/L    Chloride 97 (L) 98 - 107 mmol/L    Carbon Dioxide (CO2) 26 22 - 29 mmol/L    Anion Gap 14 7 - 15 mmol/L    Urea Nitrogen 4.5 (L) 6.0 - 20.0 mg/dL    Creatinine 0.60 0.51 - 0.95 mg/dL    Calcium 10.1 (H) 8.6 - 10.0 mg/dL    Glucose 112 (H) 70 - 99 mg/dL    Alkaline Phosphatase 182 (H) 35 - 104 U/L    AST 78 (H) 10 - 35 U/L    ALT 70 (H) 10 - 35 U/L    Protein Total 7.2 6.4 - 8.3 g/dL    Albumin 4.0  3.5 - 5.2 g/dL    Bilirubin Total 0.5 <=1.2 mg/dL    GFR Estimate >90 >60 mL/min/1.73m2   CBC with platelets and differential    Narrative    The following orders were created for panel order CBC with platelets and differential.  Procedure                               Abnormality         Status                     ---------                               -----------         ------                     CBC with platelets and d...[[id number]]  Abnormal            Final result                 Please view results for these tests on the individual orders.   Magnesium   Result Value Ref Range    Magnesium 2.0 1.7 - 2.3 mg/dL   Iron and iron binding capacity   Result Value Ref Range    Iron 210 (H) 37 - 145 ug/dL    Iron Binding Capacity 315 240 - 430 ug/dL    Iron Sat Index 67 (H) 15 - 46 %   Ferritin   Result Value Ref Range    Ferritin 622 (H) 6 - 175 ng/mL   Lipid Profile (Chol, Trig, HDL, LDL calc)   Result Value Ref Range    Cholesterol 255 (H) <200 mg/dL    Triglycerides 211 (H) <150 mg/dL    Direct Measure HDL 52 >=50 mg/dL    LDL Cholesterol Calculated 161 (H) <=100 mg/dL    Non HDL Cholesterol 203 (H) <130 mg/dL    Narrative    Cholesterol  Desirable:  <200 mg/dL    Triglycerides  Normal:  Less than 150 mg/dL  Borderline High:  150-199 mg/dL  High:  200-499 mg/dL  Very High:  Greater than or equal to 500 mg/dL    Direct Measure HDL  Female:  Greater than or equal to 50 mg/dL   Male:  Greater than or equal to 40 mg/dL    LDL Cholesterol  Desirable:  <100mg/dL  Above Desirable:  100-129 mg/dL   Borderline High:  130-159 mg/dL   High:  160-189 mg/dL   Very High:  >= 190 mg/dL    Non HDL Cholesterol  Desirable:  130 mg/dL  Above Desirable:  130-159 mg/dL  Borderline High:  160-189 mg/dL  High:  190-219 mg/dL  Very High:  Greater than or equal to 220 mg/dL   CBC with platelets and differential   Result Value Ref Range    WBC Count 12.4 (H) 4.0 - 11.0 10e3/uL    RBC Count 4.18 3.80 - 5.20 10e6/uL    Hemoglobin 14.4 11.7  - 15.7 g/dL    Hematocrit 42.2 35.0 - 47.0 %     (H) 78 - 100 fL    MCH 34.4 (H) 26.5 - 33.0 pg    MCHC 34.1 31.5 - 36.5 g/dL    RDW 12.5 10.0 - 15.0 %    Platelet Count 400 150 - 450 10e3/uL    % Neutrophils 75 %    % Lymphocytes 12 %    % Monocytes 11 %    % Eosinophils 1 %    % Basophils 0 %    % Immature Granulocytes 1 %    NRBCs per 100 WBC 0 <1 /100    Absolute Neutrophils 9.4 (H) 1.6 - 8.3 10e3/uL    Absolute Lymphocytes 1.5 0.8 - 5.3 10e3/uL    Absolute Monocytes 1.3 0.0 - 1.3 10e3/uL    Absolute Eosinophils 0.1 0.0 - 0.7 10e3/uL    Absolute Basophils 0.0 0.0 - 0.2 10e3/uL    Absolute Immature Granulocytes 0.1 <=0.4 10e3/uL    Absolute NRBCs 0.0 10e3/uL   ESR: Erythrocyte sedimentation rate   Result Value Ref Range    Erythrocyte Sedimentation Rate 10 0 - 20 mm/hr   CRP, inflammation   Result Value Ref Range    CRP Inflammation 4.16 <5.00 mg/L

## 2023-05-10 ENCOUNTER — OFFICE VISIT (OUTPATIENT)
Dept: FAMILY MEDICINE | Facility: OTHER | Age: 48
End: 2023-05-10
Attending: NURSE PRACTITIONER
Payer: COMMERCIAL

## 2023-05-10 VITALS
SYSTOLIC BLOOD PRESSURE: 140 MMHG | DIASTOLIC BLOOD PRESSURE: 90 MMHG | TEMPERATURE: 97.9 F | HEART RATE: 91 BPM | BODY MASS INDEX: 23.06 KG/M2 | RESPIRATION RATE: 17 BRPM | OXYGEN SATURATION: 98 % | WEIGHT: 138.56 LBS

## 2023-05-10 DIAGNOSIS — F10.90 HEAVY ALCOHOL USE: Primary | ICD-10-CM

## 2023-05-10 DIAGNOSIS — F41.1 GAD (GENERALIZED ANXIETY DISORDER): ICD-10-CM

## 2023-05-10 DIAGNOSIS — M62.81 GENERALIZED MUSCLE WEAKNESS: ICD-10-CM

## 2023-05-10 DIAGNOSIS — K75.81 STEATOHEPATITIS: ICD-10-CM

## 2023-05-10 DIAGNOSIS — Z13.220 LIPID SCREENING: ICD-10-CM

## 2023-05-10 DIAGNOSIS — R11.0 NAUSEA: ICD-10-CM

## 2023-05-10 DIAGNOSIS — R20.0 NUMBNESS AND TINGLING: ICD-10-CM

## 2023-05-10 DIAGNOSIS — G47.00 INSOMNIA, UNSPECIFIED TYPE: ICD-10-CM

## 2023-05-10 DIAGNOSIS — Z12.31 ENCOUNTER FOR SCREENING MAMMOGRAM FOR BREAST CANCER: ICD-10-CM

## 2023-05-10 DIAGNOSIS — R79.89 ELEVATED LFTS: ICD-10-CM

## 2023-05-10 DIAGNOSIS — E78.5 HYPERLIPIDEMIA, UNSPECIFIED HYPERLIPIDEMIA TYPE: ICD-10-CM

## 2023-05-10 DIAGNOSIS — Z12.11 SPECIAL SCREENING FOR MALIGNANT NEOPLASMS, COLON: ICD-10-CM

## 2023-05-10 DIAGNOSIS — R20.2 NUMBNESS AND TINGLING: ICD-10-CM

## 2023-05-10 DIAGNOSIS — R79.0 ABNORMAL SERUM IRON LEVEL: ICD-10-CM

## 2023-05-10 DIAGNOSIS — E83.42 HYPOMAGNESEMIA: ICD-10-CM

## 2023-05-10 DIAGNOSIS — Z09 HOSPITAL DISCHARGE FOLLOW-UP: ICD-10-CM

## 2023-05-10 DIAGNOSIS — D64.9 ANEMIA, UNSPECIFIED TYPE: ICD-10-CM

## 2023-05-10 DIAGNOSIS — F33.9 EPISODE OF RECURRENT MAJOR DEPRESSIVE DISORDER, UNSPECIFIED DEPRESSION EPISODE SEVERITY (H): ICD-10-CM

## 2023-05-10 LAB
ALBUMIN SERPL BCG-MCNC: 4 G/DL (ref 3.5–5.2)
ALP SERPL-CCNC: 182 U/L (ref 35–104)
ALT SERPL W P-5'-P-CCNC: 70 U/L (ref 10–35)
ANION GAP SERPL CALCULATED.3IONS-SCNC: 14 MMOL/L (ref 7–15)
AST SERPL W P-5'-P-CCNC: 78 U/L (ref 10–35)
BASOPHILS # BLD AUTO: 0 10E3/UL (ref 0–0.2)
BASOPHILS NFR BLD AUTO: 0 %
BILIRUB SERPL-MCNC: 0.5 MG/DL
BUN SERPL-MCNC: 4.5 MG/DL (ref 6–20)
CALCIUM SERPL-MCNC: 10.1 MG/DL (ref 8.6–10)
CHLORIDE SERPL-SCNC: 97 MMOL/L (ref 98–107)
CHOLEST SERPL-MCNC: 255 MG/DL
CREAT SERPL-MCNC: 0.6 MG/DL (ref 0.51–0.95)
CRP SERPL-MCNC: 4.16 MG/L
DEPRECATED HCO3 PLAS-SCNC: 26 MMOL/L (ref 22–29)
EOSINOPHIL # BLD AUTO: 0.1 10E3/UL (ref 0–0.7)
EOSINOPHIL NFR BLD AUTO: 1 %
ERYTHROCYTE [DISTWIDTH] IN BLOOD BY AUTOMATED COUNT: 12.5 % (ref 10–15)
ERYTHROCYTE [SEDIMENTATION RATE] IN BLOOD BY WESTERGREN METHOD: 10 MM/HR (ref 0–20)
FERRITIN SERPL-MCNC: 622 NG/ML (ref 6–175)
FOLATE SERPL-MCNC: 8.6 NG/ML (ref 4.6–34.8)
GFR SERPL CREATININE-BSD FRML MDRD: >90 ML/MIN/1.73M2
GLUCOSE SERPL-MCNC: 112 MG/DL (ref 70–99)
HCT VFR BLD AUTO: 42.2 % (ref 35–47)
HDLC SERPL-MCNC: 52 MG/DL
HGB BLD-MCNC: 14.4 G/DL (ref 11.7–15.7)
IMM GRANULOCYTES # BLD: 0.1 10E3/UL
IMM GRANULOCYTES NFR BLD: 1 %
IRON BINDING CAPACITY (ROCHE): 315 UG/DL (ref 240–430)
IRON SATN MFR SERPL: 67 % (ref 15–46)
IRON SERPL-MCNC: 210 UG/DL (ref 37–145)
LDLC SERPL CALC-MCNC: 161 MG/DL
LYMPHOCYTES # BLD AUTO: 1.5 10E3/UL (ref 0.8–5.3)
LYMPHOCYTES NFR BLD AUTO: 12 %
MAGNESIUM SERPL-MCNC: 2 MG/DL (ref 1.7–2.3)
MCH RBC QN AUTO: 34.4 PG (ref 26.5–33)
MCHC RBC AUTO-ENTMCNC: 34.1 G/DL (ref 31.5–36.5)
MCV RBC AUTO: 101 FL (ref 78–100)
MONOCYTES # BLD AUTO: 1.3 10E3/UL (ref 0–1.3)
MONOCYTES NFR BLD AUTO: 11 %
NEUTROPHILS # BLD AUTO: 9.4 10E3/UL (ref 1.6–8.3)
NEUTROPHILS NFR BLD AUTO: 75 %
NONHDLC SERPL-MCNC: 203 MG/DL
NRBC # BLD AUTO: 0 10E3/UL
NRBC BLD AUTO-RTO: 0 /100
PLATELET # BLD AUTO: 400 10E3/UL (ref 150–450)
POTASSIUM SERPL-SCNC: 4 MMOL/L (ref 3.4–5.3)
PROT SERPL-MCNC: 7.2 G/DL (ref 6.4–8.3)
RBC # BLD AUTO: 4.18 10E6/UL (ref 3.8–5.2)
SODIUM SERPL-SCNC: 137 MMOL/L (ref 136–145)
TRIGL SERPL-MCNC: 211 MG/DL
WBC # BLD AUTO: 12.4 10E3/UL (ref 4–11)

## 2023-05-10 PROCEDURE — 85025 COMPLETE CBC W/AUTO DIFF WBC: CPT | Performed by: NURSE PRACTITIONER

## 2023-05-10 PROCEDURE — 83735 ASSAY OF MAGNESIUM: CPT | Performed by: NURSE PRACTITIONER

## 2023-05-10 PROCEDURE — 82746 ASSAY OF FOLIC ACID SERUM: CPT | Performed by: NURSE PRACTITIONER

## 2023-05-10 PROCEDURE — 86140 C-REACTIVE PROTEIN: CPT | Performed by: NURSE PRACTITIONER

## 2023-05-10 PROCEDURE — 80061 LIPID PANEL: CPT | Performed by: NURSE PRACTITIONER

## 2023-05-10 PROCEDURE — 83550 IRON BINDING TEST: CPT | Performed by: NURSE PRACTITIONER

## 2023-05-10 PROCEDURE — 86780 TREPONEMA PALLIDUM: CPT | Performed by: NURSE PRACTITIONER

## 2023-05-10 PROCEDURE — 82728 ASSAY OF FERRITIN: CPT | Performed by: NURSE PRACTITIONER

## 2023-05-10 PROCEDURE — 36415 COLL VENOUS BLD VENIPUNCTURE: CPT | Performed by: NURSE PRACTITIONER

## 2023-05-10 PROCEDURE — 86706 HEP B SURFACE ANTIBODY: CPT | Performed by: NURSE PRACTITIONER

## 2023-05-10 PROCEDURE — 82607 VITAMIN B-12: CPT | Performed by: NURSE PRACTITIONER

## 2023-05-10 PROCEDURE — 85652 RBC SED RATE AUTOMATED: CPT | Performed by: NURSE PRACTITIONER

## 2023-05-10 PROCEDURE — 99215 OFFICE O/P EST HI 40 MIN: CPT | Performed by: NURSE PRACTITIONER

## 2023-05-10 PROCEDURE — 83540 ASSAY OF IRON: CPT | Performed by: NURSE PRACTITIONER

## 2023-05-10 PROCEDURE — 80053 COMPREHEN METABOLIC PANEL: CPT | Performed by: NURSE PRACTITIONER

## 2023-05-10 PROCEDURE — 87389 HIV-1 AG W/HIV-1&-2 AB AG IA: CPT | Performed by: NURSE PRACTITIONER

## 2023-05-10 PROCEDURE — 86803 HEPATITIS C AB TEST: CPT | Performed by: NURSE PRACTITIONER

## 2023-05-10 PROCEDURE — 87340 HEPATITIS B SURFACE AG IA: CPT | Performed by: NURSE PRACTITIONER

## 2023-05-10 PROCEDURE — 86709 HEPATITIS A IGM ANTIBODY: CPT | Performed by: NURSE PRACTITIONER

## 2023-05-10 RX ORDER — TRAZODONE HYDROCHLORIDE 100 MG/1
100 TABLET ORAL AT BEDTIME
Qty: 90 TABLET | Refills: 1 | Status: SHIPPED | OUTPATIENT
Start: 2023-05-10 | End: 2023-10-31

## 2023-05-10 ASSESSMENT — PAIN SCALES - GENERAL: PAINLEVEL: SEVERE PAIN (6)

## 2023-05-10 ASSESSMENT — PATIENT HEALTH QUESTIONNAIRE - PHQ9
SUM OF ALL RESPONSES TO PHQ QUESTIONS 1-9: 3
10. IF YOU CHECKED OFF ANY PROBLEMS, HOW DIFFICULT HAVE THESE PROBLEMS MADE IT FOR YOU TO DO YOUR WORK, TAKE CARE OF THINGS AT HOME, OR GET ALONG WITH OTHER PEOPLE: SOMEWHAT DIFFICULT
SUM OF ALL RESPONSES TO PHQ QUESTIONS 1-9: 3

## 2023-05-10 NOTE — LETTER
May 10, 2023      Lei Conte  [id number]  [place] [address]        To Whom It May Concern:    Freda Alanis  was seen on 05/10/2023.  Please excuse Lei from work on this day due to  patient needing assistance with care      Sincerely,        Jocelyn Watt, NP

## 2023-05-11 ENCOUNTER — TELEPHONE (OUTPATIENT)
Dept: FAMILY MEDICINE | Facility: OTHER | Age: 48
End: 2023-05-11

## 2023-05-11 LAB
HAV IGM SERPL QL IA: NONREACTIVE
HBV SURFACE AB SERPL IA-ACNC: 0.82 M[IU]/ML
HBV SURFACE AB SERPL IA-ACNC: NONREACTIVE M[IU]/ML
HBV SURFACE AG SERPL QL IA: NONREACTIVE
HCV AB SERPL QL IA: NONREACTIVE
HIV 1+2 AB+HIV1 P24 AG SERPL QL IA: NONREACTIVE
VIT B12 SERPL-MCNC: 341 PG/ML (ref 232–1245)

## 2023-05-12 DIAGNOSIS — M62.81 GENERALIZED MUSCLE WEAKNESS: Primary | ICD-10-CM

## 2023-05-12 LAB — T PALLIDUM AB SER QL: NONREACTIVE

## 2023-05-15 DIAGNOSIS — F41.1 ANXIETY STATE: ICD-10-CM

## 2023-05-15 RX ORDER — GABAPENTIN 100 MG/1
100 CAPSULE ORAL 3 TIMES DAILY PRN
Qty: 90 CAPSULE | Refills: 0 | Status: SHIPPED | OUTPATIENT
Start: 2023-06-02

## 2023-05-15 NOTE — TELEPHONE ENCOUNTER
Gabapentin      Last Written Prescription Date:  5.4.23  Last Fill Quantity: #30,   # refills: 0  Last Office Visit: 5.10.23  Future Office visit:       Routing refill request to provider for review/approval because:  Drug not on the [facility] [place] [facility], P or [facility] refill protocol or controlled substance

## 2023-05-16 ENCOUNTER — TELEPHONE (OUTPATIENT)
Dept: FAMILY MEDICINE | Facility: OTHER | Age: 48
End: 2023-05-16

## 2023-05-16 NOTE — TELEPHONE ENCOUNTER
Received a PA from [employer] for Gabapentin 100MG capsules. Submitted on CMM. Waiting for a response.

## 2023-05-17 NOTE — TELEPHONE ENCOUNTER
Received an APPROVAL from [employer] for Gabapentin 100mg capsules. Effective 04/16/2023 to 05/15/2024. Forms scanned to Epic.

## 2023-09-07 PROBLEM — E83.42 HYPOMAGNESEMIA: Status: RESOLVED | Noted: 2023-04-30 | Resolved: 2023-09-07

## 2023-09-07 PROBLEM — F10.90 HEAVY ALCOHOL USE: Status: ACTIVE | Noted: 2023-09-07

## 2023-09-07 PROBLEM — E86.0 DEHYDRATION: Status: RESOLVED | Noted: 2023-04-30 | Resolved: 2023-09-07

## 2023-10-31 DIAGNOSIS — G47.00 INSOMNIA, UNSPECIFIED TYPE: ICD-10-CM

## 2023-10-31 RX ORDER — TRAZODONE HYDROCHLORIDE 100 MG/1
100 TABLET ORAL AT BEDTIME
Qty: 90 TABLET | Refills: 1 | Status: SHIPPED | OUTPATIENT
Start: 2023-10-31 | End: 2024-04-22

## 2023-10-31 NOTE — TELEPHONE ENCOUNTER
Trazodone      Last Written Prescription Date:  5/10/23  Last Fill Quantity: 90,   # refills: 1  Last Office Visit: 5/10/23  Future Office visit:       Routing refill request to provider for review/approval because:

## 2024-04-22 DIAGNOSIS — G47.00 INSOMNIA, UNSPECIFIED TYPE: ICD-10-CM

## 2024-04-22 RX ORDER — TRAZODONE HYDROCHLORIDE 100 MG/1
100 TABLET ORAL AT BEDTIME
Qty: 90 TABLET | Refills: 0 | Status: SHIPPED | OUTPATIENT
Start: 2024-04-22